# Patient Record
Sex: FEMALE | Race: WHITE | NOT HISPANIC OR LATINO | Employment: OTHER | URBAN - NONMETROPOLITAN AREA
[De-identification: names, ages, dates, MRNs, and addresses within clinical notes are randomized per-mention and may not be internally consistent; named-entity substitution may affect disease eponyms.]

---

## 2019-11-21 ENCOUNTER — HOSPITAL ENCOUNTER (EMERGENCY)
Facility: HOSPITAL | Age: 74
Discharge: HOME | End: 2019-11-21
Attending: EMERGENCY MEDICINE
Payer: COMMERCIAL

## 2019-11-21 ENCOUNTER — APPOINTMENT (EMERGENCY)
Dept: RADIOLOGY | Facility: HOSPITAL | Age: 74
End: 2019-11-21
Payer: COMMERCIAL

## 2019-11-21 VITALS
SYSTOLIC BLOOD PRESSURE: 212 MMHG | OXYGEN SATURATION: 96 % | BODY MASS INDEX: 31.77 KG/M2 | HEART RATE: 66 BPM | RESPIRATION RATE: 18 BRPM | HEIGHT: 64 IN | WEIGHT: 186.07 LBS | DIASTOLIC BLOOD PRESSURE: 104 MMHG | TEMPERATURE: 98.4 F

## 2019-11-21 DIAGNOSIS — S20.211A CONTUSION OF RIGHT CHEST WALL, INITIAL ENCOUNTER: Primary | ICD-10-CM

## 2019-11-21 PROCEDURE — 99285 EMERGENCY DEPT VISIT HI MDM: CPT

## 2019-11-21 PROCEDURE — 99282 EMERGENCY DEPT VISIT SF MDM: CPT | Performed by: PHYSICIAN ASSISTANT

## 2019-11-21 PROCEDURE — 73564 X-RAY EXAM KNEE 4 OR MORE: CPT

## 2019-11-21 PROCEDURE — 71046 X-RAY EXAM CHEST 2 VIEWS: CPT

## 2019-11-21 RX ORDER — HYDROCHLOROTHIAZIDE 25 MG/1
25 TABLET ORAL DAILY
COMMUNITY

## 2019-11-21 RX ORDER — MELATONIN
5000 DAILY
COMMUNITY

## 2019-11-21 RX ORDER — QUINAPRIL 10 MG/1
10 TABLET ORAL DAILY
COMMUNITY

## 2019-11-21 RX ORDER — METOPROLOL SUCCINATE 100 MG/1
100 TABLET, EXTENDED RELEASE ORAL DAILY
COMMUNITY

## 2019-11-21 RX ORDER — VERAPAMIL HYDROCHLORIDE 180 MG/1
180 CAPSULE, EXTENDED RELEASE ORAL DAILY
COMMUNITY
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
## 2019-11-21 NOTE — DISCHARGE INSTRUCTIONS
Follow up with your family doctor within one week or sooner if needed  You should have the incidental finding from your chest x-ray further assessed on an outpatient basis  The results are listed below  Use Tylenol as needed for pain, as well as other conservative measures listed in the patient instructions, RICE  Return to the emergency department for any new, worsening, or concerning symptoms   XR chest 2 views   Final Result      No acute cardiopulmonary disease  Prominence of the superior mediastinum on the right is probably related to ectatic vasculature or perhaps enlarged thyroid  Elective follow-up CT or thyroid ultrasound could be performed if no prior chest x-rays are available for comparison  This was discussed with MIRYAM Reyes            Workstation performed: [id number]         XR knee 4+ vw right injury   Final Result      No acute osseous abnormality  Trace effusion              Workstation performed: [id number]

## 2020-06-15 ENCOUNTER — HOSPITAL ENCOUNTER (EMERGENCY)
Facility: HOSPITAL | Age: 75
Discharge: HOME | End: 2020-06-15
Attending: EMERGENCY MEDICINE | Admitting: EMERGENCY MEDICINE
Payer: MEDICARE

## 2020-06-15 ENCOUNTER — APPOINTMENT (EMERGENCY)
Dept: RADIOLOGY | Facility: HOSPITAL | Age: 75
End: 2020-06-15
Payer: MEDICARE

## 2020-06-15 VITALS
HEIGHT: 66 IN | RESPIRATION RATE: 18 BRPM | BODY MASS INDEX: 28.13 KG/M2 | DIASTOLIC BLOOD PRESSURE: 62 MMHG | OXYGEN SATURATION: 95 % | HEART RATE: 66 BPM | WEIGHT: 175.04 LBS | TEMPERATURE: 98.6 F | SYSTOLIC BLOOD PRESSURE: 127 MMHG

## 2020-06-15 DIAGNOSIS — N39.0 UTI (URINARY TRACT INFECTION): Primary | ICD-10-CM

## 2020-06-15 LAB
ALBUMIN SERPL BCP-MCNC: 3.2 G/DL (ref 3.5–5)
ALP SERPL-CCNC: 82 U/L (ref 46–116)
ALT SERPL W P-5'-P-CCNC: 23 U/L (ref 12–78)
ANION GAP SERPL CALCULATED.3IONS-SCNC: 8 MMOL/L (ref 4–13)
AST SERPL W P-5'-P-CCNC: 23 U/L (ref 5–45)
BACTERIA UR QL AUTO: ABNORMAL /HPF
BASOPHILS # BLD AUTO: 0.02 THOUSANDS/ΜL (ref 0–0.1)
BASOPHILS NFR BLD AUTO: 0 % (ref 0–1)
BILIRUB SERPL-MCNC: 0.39 MG/DL (ref 0.2–1)
BILIRUB UR QL STRIP: ABNORMAL
BUN SERPL-MCNC: 22 MG/DL (ref 5–25)
CALCIUM SERPL-MCNC: 9.2 MG/DL (ref 8.3–10.1)
CHLORIDE SERPL-SCNC: 97 MMOL/L (ref 100–108)
CLARITY UR: CLEAR
CO2 SERPL-SCNC: 30 MMOL/L (ref 21–32)
COLOR UR: YELLOW
CREAT SERPL-MCNC: 1.26 MG/DL (ref 0.6–1.3)
EOSINOPHIL # BLD AUTO: 0 THOUSAND/ΜL (ref 0–0.61)
EOSINOPHIL NFR BLD AUTO: 0 % (ref 0–6)
ERYTHROCYTE [DISTWIDTH] IN BLOOD BY AUTOMATED COUNT: 12.7 % (ref 11.6–15.1)
GFR SERPL CREATININE-BSD FRML MDRD: 41 ML/MIN/1.73SQ M
GLUCOSE SERPL-MCNC: 111 MG/DL (ref 65–140)
GLUCOSE UR STRIP-MCNC: NEGATIVE MG/DL
HCT VFR BLD AUTO: 41.1 % (ref 34.8–46.1)
HGB BLD-MCNC: 14.4 G/DL (ref 11.5–15.4)
HGB UR QL STRIP.AUTO: ABNORMAL
IMM GRANULOCYTES # BLD AUTO: 0.03 THOUSAND/UL (ref 0–0.2)
IMM GRANULOCYTES NFR BLD AUTO: 0 % (ref 0–2)
KETONES UR STRIP-MCNC: NEGATIVE MG/DL
LACTATE SERPL-SCNC: 0.9 MMOL/L (ref 0.5–2)
LEUKOCYTE ESTERASE UR QL STRIP: ABNORMAL
LYMPHOCYTES # BLD AUTO: 1.22 THOUSANDS/ΜL (ref 0.6–4.47)
LYMPHOCYTES NFR BLD AUTO: 16 % (ref 14–44)
MCH RBC QN AUTO: 32.3 PG (ref 26.8–34.3)
MCHC RBC AUTO-ENTMCNC: 35 G/DL (ref 31.4–37.4)
MCV RBC AUTO: 92 FL (ref 82–98)
MONOCYTES # BLD AUTO: 1.05 THOUSAND/ΜL (ref 0.17–1.22)
MONOCYTES NFR BLD AUTO: 13 % (ref 4–12)
NEUTROPHILS # BLD AUTO: 5.51 THOUSANDS/ΜL (ref 1.85–7.62)
NEUTS SEG NFR BLD AUTO: 71 % (ref 43–75)
NITRITE UR QL STRIP: NEGATIVE
NON-SQ EPI CELLS URNS QL MICRO: ABNORMAL /HPF
NRBC BLD AUTO-RTO: 0 /100 WBCS
PH UR STRIP.AUTO: 5.5 [PH]
PLATELET # BLD AUTO: 221 THOUSANDS/UL (ref 149–390)
PMV BLD AUTO: 10.9 FL (ref 8.9–12.7)
POTASSIUM SERPL-SCNC: 3.6 MMOL/L (ref 3.5–5.3)
PROT SERPL-MCNC: 8.1 G/DL (ref 6.4–8.2)
PROT UR STRIP-MCNC: ABNORMAL MG/DL
RBC # BLD AUTO: 4.46 MILLION/UL (ref 3.81–5.12)
RBC #/AREA URNS AUTO: ABNORMAL /HPF
SARS-COV-2 RNA RESP QL NAA+PROBE: NEGATIVE
SODIUM SERPL-SCNC: 135 MMOL/L (ref 136–145)
SP GR UR STRIP.AUTO: >=1.03 (ref 1–1.03)
TROPONIN I SERPL-MCNC: <0.02 NG/ML
UROBILINOGEN UR QL STRIP.AUTO: 1 E.U./DL
WBC # BLD AUTO: 7.83 THOUSAND/UL (ref 4.31–10.16)
WBC #/AREA URNS AUTO: ABNORMAL /HPF

## 2020-06-15 PROCEDURE — 71046 X-RAY EXAM CHEST 2 VIEWS: CPT

## 2020-06-15 PROCEDURE — 81001 URINALYSIS AUTO W/SCOPE: CPT | Performed by: PHYSICIAN ASSISTANT

## 2020-06-15 PROCEDURE — 87040 BLOOD CULTURE FOR BACTERIA: CPT | Performed by: PHYSICIAN ASSISTANT

## 2020-06-15 PROCEDURE — 71045 X-RAY EXAM CHEST 1 VIEW: CPT

## 2020-06-15 PROCEDURE — 36415 COLL VENOUS BLD VENIPUNCTURE: CPT | Performed by: PHYSICIAN ASSISTANT

## 2020-06-15 PROCEDURE — 99284 EMERGENCY DEPT VISIT MOD MDM: CPT

## 2020-06-15 PROCEDURE — 85025 COMPLETE CBC W/AUTO DIFF WBC: CPT | Performed by: PHYSICIAN ASSISTANT

## 2020-06-15 PROCEDURE — 96365 THER/PROPH/DIAG IV INF INIT: CPT

## 2020-06-15 PROCEDURE — 87086 URINE CULTURE/COLONY COUNT: CPT | Performed by: PHYSICIAN ASSISTANT

## 2020-06-15 PROCEDURE — 80053 COMPREHEN METABOLIC PANEL: CPT | Performed by: PHYSICIAN ASSISTANT

## 2020-06-15 PROCEDURE — 87635 SARS-COV-2 COVID-19 AMP PRB: CPT | Performed by: PHYSICIAN ASSISTANT

## 2020-06-15 PROCEDURE — 84484 ASSAY OF TROPONIN QUANT: CPT | Performed by: PHYSICIAN ASSISTANT

## 2020-06-15 PROCEDURE — 99285 EMERGENCY DEPT VISIT HI MDM: CPT | Performed by: EMERGENCY MEDICINE

## 2020-06-15 PROCEDURE — 96361 HYDRATE IV INFUSION ADD-ON: CPT

## 2020-06-15 PROCEDURE — 93005 ELECTROCARDIOGRAM TRACING: CPT

## 2020-06-15 PROCEDURE — 83605 ASSAY OF LACTIC ACID: CPT | Performed by: PHYSICIAN ASSISTANT

## 2020-06-15 RX ORDER — ACETAMINOPHEN 325 MG/1
650 TABLET ORAL ONCE
Status: COMPLETED | OUTPATIENT
Start: 2020-06-15 | End: 2020-06-15

## 2020-06-15 RX ORDER — CEPHALEXIN 500 MG/1
500 CAPSULE ORAL EVERY 12 HOURS SCHEDULED
Qty: 20 CAPSULE | Refills: 0 | Status: SHIPPED | OUTPATIENT
Start: 2020-06-15 | End: 2020-06-25

## 2020-06-15 RX ORDER — CEFTRIAXONE 1 G/50ML
1000 INJECTION, SOLUTION INTRAVENOUS ONCE
Status: COMPLETED | OUTPATIENT
Start: 2020-06-15 | End: 2020-06-15

## 2020-06-15 RX ADMIN — CEFTRIAXONE 1000 MG: 1 INJECTION, SOLUTION INTRAVENOUS at 17:18

## 2020-06-15 RX ADMIN — ACETAMINOPHEN 650 MG: 325 TABLET ORAL at 15:11

## 2020-06-15 RX ADMIN — SODIUM CHLORIDE 1000 ML: 0.9 INJECTION, SOLUTION INTRAVENOUS at 15:05

## 2020-06-16 LAB
ATRIAL RATE: 78 BPM
P AXIS: 61 DEGREES
PR INTERVAL: 216 MS
QRS AXIS: 0 DEGREES
QRSD INTERVAL: 98 MS
QT INTERVAL: 384 MS
QTC INTERVAL: 437 MS
T WAVE AXIS: 48 DEGREES
VENTRICULAR RATE: 78 BPM

## 2020-06-17 ENCOUNTER — HOSPITAL ENCOUNTER (EMERGENCY)
Facility: HOSPITAL | Age: 75
Discharge: HOME | End: 2020-06-17
Attending: EMERGENCY MEDICINE | Admitting: EMERGENCY MEDICINE
Payer: MEDICARE

## 2020-06-17 ENCOUNTER — APPOINTMENT (EMERGENCY)
Dept: CT IMAGING | Facility: HOSPITAL | Age: 75
End: 2020-06-17
Payer: MEDICARE

## 2020-06-17 VITALS
SYSTOLIC BLOOD PRESSURE: 156 MMHG | DIASTOLIC BLOOD PRESSURE: 90 MMHG | WEIGHT: 174.38 LBS | BODY MASS INDEX: 28.03 KG/M2 | RESPIRATION RATE: 16 BRPM | HEART RATE: 70 BPM | TEMPERATURE: 98.9 F | HEIGHT: 66 IN | OXYGEN SATURATION: 96 %

## 2020-06-17 DIAGNOSIS — J18.9 PNEUMONIA: ICD-10-CM

## 2020-06-17 DIAGNOSIS — R50.9 FEVER: Primary | ICD-10-CM

## 2020-06-17 DIAGNOSIS — N39.0 UTI (URINARY TRACT INFECTION): ICD-10-CM

## 2020-06-17 LAB
ALBUMIN SERPL BCP-MCNC: 2.9 G/DL (ref 3.5–5)
ALP SERPL-CCNC: 84 U/L (ref 46–116)
ALT SERPL W P-5'-P-CCNC: 25 U/L (ref 12–78)
ANION GAP SERPL CALCULATED.3IONS-SCNC: 6 MMOL/L (ref 4–13)
APTT PPP: 32 SECONDS (ref 23–37)
AST SERPL W P-5'-P-CCNC: 20 U/L (ref 5–45)
BACTERIA UR CULT: NORMAL
BACTERIA UR QL AUTO: ABNORMAL /HPF
BASOPHILS # BLD AUTO: 0.01 THOUSANDS/ΜL (ref 0–0.1)
BASOPHILS NFR BLD AUTO: 0 % (ref 0–1)
BILIRUB SERPL-MCNC: 0.22 MG/DL (ref 0.2–1)
BILIRUB UR QL STRIP: NEGATIVE
BUN SERPL-MCNC: 21 MG/DL (ref 5–25)
CALCIUM SERPL-MCNC: 9.2 MG/DL (ref 8.3–10.1)
CHLORIDE SERPL-SCNC: 98 MMOL/L (ref 100–108)
CLARITY UR: CLEAR
CO2 SERPL-SCNC: 33 MMOL/L (ref 21–32)
COLOR UR: YELLOW
CREAT SERPL-MCNC: 1.29 MG/DL (ref 0.6–1.3)
EOSINOPHIL # BLD AUTO: 0.04 THOUSAND/ΜL (ref 0–0.61)
EOSINOPHIL NFR BLD AUTO: 1 % (ref 0–6)
ERYTHROCYTE [DISTWIDTH] IN BLOOD BY AUTOMATED COUNT: 12.9 % (ref 11.6–15.1)
GFR SERPL CREATININE-BSD FRML MDRD: 40 ML/MIN/1.73SQ M
GLUCOSE SERPL-MCNC: 104 MG/DL (ref 65–140)
GLUCOSE UR STRIP-MCNC: NEGATIVE MG/DL
HCT VFR BLD AUTO: 38.2 % (ref 34.8–46.1)
HGB BLD-MCNC: 13.1 G/DL (ref 11.5–15.4)
HGB UR QL STRIP.AUTO: ABNORMAL
IMM GRANULOCYTES # BLD AUTO: 0.02 THOUSAND/UL (ref 0–0.2)
IMM GRANULOCYTES NFR BLD AUTO: 0 % (ref 0–2)
INR PPP: 1.25 (ref 0.84–1.19)
KETONES UR STRIP-MCNC: NEGATIVE MG/DL
LACTATE SERPL-SCNC: 0.9 MMOL/L (ref 0.5–2)
LEUKOCYTE ESTERASE UR QL STRIP: NEGATIVE
LYMPHOCYTES # BLD AUTO: 0.96 THOUSANDS/ΜL (ref 0.6–4.47)
LYMPHOCYTES NFR BLD AUTO: 18 % (ref 14–44)
MCH RBC QN AUTO: 31.8 PG (ref 26.8–34.3)
MCHC RBC AUTO-ENTMCNC: 34.3 G/DL (ref 31.4–37.4)
MCV RBC AUTO: 93 FL (ref 82–98)
MONOCYTES # BLD AUTO: 0.93 THOUSAND/ΜL (ref 0.17–1.22)
MONOCYTES NFR BLD AUTO: 17 % (ref 4–12)
NEUTROPHILS # BLD AUTO: 3.53 THOUSANDS/ΜL (ref 1.85–7.62)
NEUTS SEG NFR BLD AUTO: 64 % (ref 43–75)
NITRITE UR QL STRIP: NEGATIVE
NON-SQ EPI CELLS URNS QL MICRO: ABNORMAL /HPF
NRBC BLD AUTO-RTO: 0 /100 WBCS
PH UR STRIP.AUTO: 6 [PH]
PLATELET # BLD AUTO: 233 THOUSANDS/UL (ref 149–390)
PMV BLD AUTO: 11.2 FL (ref 8.9–12.7)
POTASSIUM SERPL-SCNC: 3.9 MMOL/L (ref 3.5–5.3)
PROT SERPL-MCNC: 7.7 G/DL (ref 6.4–8.2)
PROT UR STRIP-MCNC: ABNORMAL MG/DL
PROTHROMBIN TIME: 15.8 SECONDS (ref 11.6–14.5)
RBC # BLD AUTO: 4.12 MILLION/UL (ref 3.81–5.12)
RBC #/AREA URNS AUTO: ABNORMAL /HPF
SODIUM SERPL-SCNC: 137 MMOL/L (ref 136–145)
SP GR UR STRIP.AUTO: 1.02 (ref 1–1.03)
UROBILINOGEN UR QL STRIP.AUTO: 1 E.U./DL
WBC # BLD AUTO: 5.49 THOUSAND/UL (ref 4.31–10.16)
WBC #/AREA URNS AUTO: ABNORMAL /HPF

## 2020-06-17 PROCEDURE — 87040 BLOOD CULTURE FOR BACTERIA: CPT | Performed by: EMERGENCY MEDICINE

## 2020-06-17 PROCEDURE — 99285 EMERGENCY DEPT VISIT HI MDM: CPT | Performed by: EMERGENCY MEDICINE

## 2020-06-17 PROCEDURE — 93005 ELECTROCARDIOGRAM TRACING: CPT

## 2020-06-17 PROCEDURE — 96360 HYDRATION IV INFUSION INIT: CPT

## 2020-06-17 PROCEDURE — 80053 COMPREHEN METABOLIC PANEL: CPT | Performed by: EMERGENCY MEDICINE

## 2020-06-17 PROCEDURE — 85610 PROTHROMBIN TIME: CPT | Performed by: EMERGENCY MEDICINE

## 2020-06-17 PROCEDURE — 85025 COMPLETE CBC W/AUTO DIFF WBC: CPT | Performed by: EMERGENCY MEDICINE

## 2020-06-17 PROCEDURE — 83605 ASSAY OF LACTIC ACID: CPT | Performed by: EMERGENCY MEDICINE

## 2020-06-17 PROCEDURE — 71250 CT THORAX DX C-: CPT

## 2020-06-17 PROCEDURE — 99284 EMERGENCY DEPT VISIT MOD MDM: CPT

## 2020-06-17 PROCEDURE — 81001 URINALYSIS AUTO W/SCOPE: CPT | Performed by: EMERGENCY MEDICINE

## 2020-06-17 PROCEDURE — 36415 COLL VENOUS BLD VENIPUNCTURE: CPT | Performed by: EMERGENCY MEDICINE

## 2020-06-17 PROCEDURE — 85730 THROMBOPLASTIN TIME PARTIAL: CPT | Performed by: EMERGENCY MEDICINE

## 2020-06-17 PROCEDURE — 87086 URINE CULTURE/COLONY COUNT: CPT | Performed by: EMERGENCY MEDICINE

## 2020-06-17 RX ORDER — CEFTRIAXONE 1 G/50ML
1000 INJECTION, SOLUTION INTRAVENOUS ONCE
Status: DISCONTINUED | OUTPATIENT
Start: 2020-06-17 | End: 2020-06-17

## 2020-06-17 RX ORDER — ACETAMINOPHEN 325 MG/1
975 TABLET ORAL ONCE
Status: COMPLETED | OUTPATIENT
Start: 2020-06-17 | End: 2020-06-17

## 2020-06-17 RX ORDER — LEVOFLOXACIN 500 MG/1
500 TABLET, FILM COATED ORAL DAILY
Qty: 10 TABLET | Refills: 0 | Status: SHIPPED | OUTPATIENT
Start: 2020-06-17 | End: 2020-06-27

## 2020-06-17 RX ADMIN — SODIUM CHLORIDE 1000 ML: 0.9 INJECTION, SOLUTION INTRAVENOUS at 16:41

## 2020-06-17 RX ADMIN — LEVOFLOXACIN 750 MG: 500 TABLET, FILM COATED ORAL at 17:46

## 2020-06-17 RX ADMIN — ACETAMINOPHEN 975 MG: 325 TABLET ORAL at 16:38

## 2020-06-18 LAB
ATRIAL RATE: 69 BPM
BACTERIA UR CULT: NORMAL
P AXIS: 74 DEGREES
PR INTERVAL: 216 MS
QRS AXIS: 49 DEGREES
QRSD INTERVAL: 98 MS
QT INTERVAL: 434 MS
QTC INTERVAL: 465 MS
T WAVE AXIS: -70 DEGREES
VENTRICULAR RATE: 69 BPM

## 2020-06-20 LAB — BACTERIA BLD CULT: NORMAL

## 2020-06-24 LAB
BACTERIA BLD CULT: NORMAL
BACTERIA BLD CULT: NORMAL

## 2021-10-21 ENCOUNTER — HOSPITAL ENCOUNTER (EMERGENCY)
Facility: HOSPITAL | Age: 76
Discharge: HOME | End: 2021-10-21
Attending: EMERGENCY MEDICINE | Admitting: EMERGENCY MEDICINE
Payer: MEDICARE

## 2021-10-21 VITALS
HEART RATE: 62 BPM | WEIGHT: 170 LBS | BODY MASS INDEX: 29.02 KG/M2 | OXYGEN SATURATION: 95 % | HEIGHT: 64 IN | TEMPERATURE: 98.3 F | RESPIRATION RATE: 16 BRPM
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
## 2021-10-21 PROCEDURE — 99282 EMERGENCY DEPT VISIT SF MDM: CPT

## 2021-10-21 PROCEDURE — 99284 EMERGENCY DEPT VISIT MOD MDM: CPT | Performed by: EMERGENCY MEDICINE

## 2021-10-21 RX ORDER — DOXYCYCLINE HYCLATE 100 MG/1
100 CAPSULE ORAL ONCE
Status: COMPLETED | OUTPATIENT
Start: 2021-10-21 | End: 2021-10-21

## 2021-10-21 RX ORDER — DOXYCYCLINE HYCLATE 100 MG/1
100 CAPSULE ORAL 2 TIMES DAILY
Qty: 14 CAPSULE | Refills: 0 | Status: SHIPPED | OUTPATIENT
Start: 2021-10-21 | End: 2021-10-28

## 2021-10-21 RX ADMIN — DOXYCYCLINE 100 MG: 100 CAPSULE ORAL at 12:42

## 2023-08-07 RX ORDER — HYDROCHLOROTHIAZIDE 25 MG/1
TABLET ORAL
COMMUNITY
End: 2023-08-08

## 2023-08-07 RX ORDER — VERAPAMIL HYDROCHLORIDE 180 MG/1
CAPSULE, EXTENDED RELEASE ORAL
COMMUNITY
End: 2023-08-08

## 2023-08-07 RX ORDER — LORAZEPAM 0.5 MG/1
0.5 TABLET ORAL
COMMUNITY

## 2023-08-08 ENCOUNTER — OFFICE VISIT (OUTPATIENT)
Dept: FAMILY MEDICINE CLINIC | Facility: CLINIC | Age: 78
End: 2023-08-08
Payer: MEDICARE

## 2023-08-08 VITALS
BODY MASS INDEX: 28.03 KG/M2 | HEIGHT: 66 IN | HEART RATE: 56 BPM | DIASTOLIC BLOOD PRESSURE: 85 MMHG | SYSTOLIC BLOOD PRESSURE: 125 MMHG | WEIGHT: 174.38 LBS | OXYGEN SATURATION: 96 % | TEMPERATURE: 98.5 F

## 2023-08-08 DIAGNOSIS — E55.9 VITAMIN D DEFICIENCY: ICD-10-CM

## 2023-08-08 DIAGNOSIS — E78.2 MIXED HYPERLIPIDEMIA: ICD-10-CM

## 2023-08-08 DIAGNOSIS — N18.31 STAGE 3A CHRONIC KIDNEY DISEASE (HCC): ICD-10-CM

## 2023-08-08 DIAGNOSIS — K76.89 HEPATIC CYST: ICD-10-CM

## 2023-08-08 DIAGNOSIS — K82.4 GALLBLADDER POLYP: ICD-10-CM

## 2023-08-08 DIAGNOSIS — K59.09 CHRONIC CONSTIPATION: ICD-10-CM

## 2023-08-08 DIAGNOSIS — Z76.89 ENCOUNTER TO ESTABLISH CARE: Primary | ICD-10-CM

## 2023-08-08 DIAGNOSIS — I10 ESSENTIAL HYPERTENSION: ICD-10-CM

## 2023-08-08 DIAGNOSIS — M85.80 OSTEOPENIA AFTER MENOPAUSE: ICD-10-CM

## 2023-08-08 DIAGNOSIS — I42.2 HYPERTROPHIC CARDIOMYOPATHY (HCC): ICD-10-CM

## 2023-08-08 DIAGNOSIS — K86.2 PANCREATIC CYST: ICD-10-CM

## 2023-08-08 DIAGNOSIS — Z78.0 OSTEOPENIA AFTER MENOPAUSE: ICD-10-CM

## 2023-08-08 PROBLEM — M81.0 OSTEOPOROSIS WITHOUT CURRENT PATHOLOGICAL FRACTURE: Status: ACTIVE | Noted: 2023-08-08

## 2023-08-08 PROBLEM — R42 VERTIGO: Status: ACTIVE | Noted: 2023-08-08

## 2023-08-08 PROCEDURE — 99205 OFFICE O/P NEW HI 60 MIN: CPT | Performed by: FAMILY MEDICINE

## 2023-08-08 RX ORDER — RAMIPRIL 10 MG/1
1 CAPSULE ORAL DAILY
COMMUNITY
Start: 2023-06-26

## 2023-08-08 NOTE — ASSESSMENT & PLAN NOTE
History of low vitamin D >5 years ago  Recent labs have shown improvement with levels in 40's   Continue vitamin D supplementation daily 1000u  Mgmt of osteopenia as above  Monitor Vit D labs q12 months.

## 2023-08-08 NOTE — ASSESSMENT & PLAN NOTE
3/3/23  8:25 AM   Cholesterol 188    Triglycerides 122    HDL 46    LDL Calculated 117.6    Non HDL Chol. (LDL+VLDL) 142    Chol/HDL Ratio 4.09      Cholesterol levels mildly elevated >5 years ago  Most recent labs within goal range  Overall, has been lifestyle controlled  Recommend continue with healthy lifestyle habits  Monitor lipid panel q 6-12 mo

## 2023-08-08 NOTE — ASSESSMENT & PLAN NOTE
Chronic, follows with cardiology at [facility]  Echocardiogram 6/14/2022: "moderate concentric left ventricular hypertrophy with ejection fraction of 80% with indeterminate diastolic parameters, minimal trace aortic regurgitation, trace to mild mitral regurgitation, trace tricuspid regurgitation with normal pulmonary pressure estimate.  Overall, no significant change."     Plan: continue toprol XL 100mg daily, verapamil 180mg daily  Continue to monitor BMP, Lipid q6-12mo  F/u 6 mo

## 2023-08-08 NOTE — PROGRESS NOTES
Name: Gurpreet Kapadia      :       MRN: [id number]  Encounter Provider: Cody Ugalde DO  Encounter Date: 2023   Encounter department: [facility]    Assessment & Plan     1. Encounter to establish care    2. Hepatic cyst  Assessment & Plan:  Reviewed Surgical Oncology office visit 2023:  "had this liver mass since .  it was 7 cm, now at 11 cm. She also has a smaller hemangioma 5 cm which is not exophytic. These are benign and if they are not causing her symptoms we can leave them alone for now."    Patient remains asymptomatic   Monitor CMP q6-12 mo  Continue to monitor for development of symptoms    Orders:  -     Comprehensive metabolic panel; Future    3. 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 This is a cystic tumor in the pancreas which has a chance to develop into cancer, a 50 - 70% chance, if left alone."     Per their plan: "MRI/MRCP of the abdomen in 6 months to a year. This can be done by her PCP or DDA. She should f/u in the office after imaging."     At this time, patient remains asymptomatic. Will plan for MRI/MRCP in Spring 2024 based on the above recommendations. 4. Gallbladder polyp  Assessment & Plan:  Reviewed surgical oncology note from 2023: "By US of 10/14/2022 she has multiple GB polyps, the largest 6 mm. This was not confirmed by MRI of 2023. If she does have gallbladder polyps, they have a chance to develop into cancer. recommendation is to have an MRI/MRCP of the abdomen in 6 months to a year. This can be done by her PCP or DDA. She should f/u in the office after imaging."    At this time, patient remains asymptomatic. Will plan for MRI/MRCP in Spring 2024. Continue to monitor for symptom development.        5. Mixed hyperlipidemia  Assessment & Plan:   3/3/23  8:25 AM   Cholesterol 188 Triglycerides 122    HDL 46    LDL Calculated 117.6    Non HDL Chol. (LDL+VLDL) 142    Chol/HDL Ratio 4.09      Cholesterol levels mildly elevated >5 years ago  Most recent labs within goal range  Overall, has been lifestyle controlled  Recommend continue with healthy lifestyle habits  Monitor lipid panel q 6-12 mo    Orders:  -     Comprehensive metabolic panel; Future  -     Lipid panel; Future    6. Essential hypertension  Assessment & Plan:  Chronic, stable  Current Blood Pressure: 125/85  Follows with Cardiology at [facility]   Current meds: HCTZ 25mg daily, toprol XL 100mg daily, ramipril 10mg daily, verapamil 180mg daily    Plan:  Continue current medications  Monitor BMP q6mo  F/u 6mo    Orders:  -     Comprehensive metabolic panel; Future  -     Lipid panel; Future    7. Osteopenia after menopause  Assessment & Plan:  Dexa 2020: 1. Normal central bone density. 2. Density of the lumbar spine increased by 0.2% from the prior exam which is not significant. 3. Density of the left hip increased by 6.1% from the prior exam which is significant. Continue vit D and Ca supplementation  Continue healthy lifestyle habits  Monitor dexa q2 years, will plan for repeat dexa ordered at follow up AMW visit in march 2024.       8. Vitamin D deficiency  Assessment & Plan:  History of low vitamin D >5 years ago  Recent labs have shown improvement with levels in 40's   Continue vitamin D supplementation daily 1000u  Mgmt of osteopenia as above  Monitor Vit D labs q12 months. Orders:  -     Vitamin D 25 hydroxy; Future    9. Hypertrophic cardiomyopathy ([address])  Assessment & Plan:  Chronic, follows with cardiology at [facility]  Echocardiogram 6/14/2022: "moderate concentric left ventricular hypertrophy with ejection fraction of 80% with indeterminate diastolic parameters, minimal trace aortic regurgitation, trace to mild mitral regurgitation, trace tricuspid regurgitation with normal pulmonary pressure estimate. Overall, no significant change."     Plan: continue toprol XL 100mg daily, verapamil 180mg daily  Continue to monitor BMP, Lipid q6-12mo  F/u 6 mo    Orders:  -     Comprehensive metabolic panel; Future  -     Lipid panel; Future    10. Stage 3a chronic kidney disease ([address])  Assessment & Plan:  Chronic, stable  Labs 3/2023: Cr 1.14, GFR 46  Continue mgmt HTN as above  Continue to monitor with labs q 6mo     Orders:  -     Comprehensive metabolic panel; Future  -     Lipid panel; Future    11. Chronic constipation  Assessment & Plan:  Chronic, stable  Continue daily fiber supplementation  Continue to follow with GI as scheduled  Continue to monitor for acute worsening of symptoms          Return in about 6 months (around 2/8/2024) for Recheck chronic conditions. A total of 70 minutes was spent rendering care for this patient. This time included review of the patient's electronic medical record, performing the history and physical, reviewing appropriate labs and/or images, developing a treatment and assessment plan, answering patient's questions and concerns, and documenting the patient visit. Subjective      HPI   CC: establish care    PMH: vertigo, vit D deficiency, osteopenia, HLD, HTN, CKD3, hepatic cyst, pancreatic cyst, gallbladder polyps, hypertrophic cardiomyopathy, chronic constipation  SurgHx:    APPENDECTOMY >20 years ago   RIGHT TOTAL KNEE REPLACEMENT Right 07/21/2017    TOTAL ABD HYSTERECTOMY W/ BILATERAL SALPINGOOPHORECTOMY 4/5/94    [facility] D[facility]C 7/31/90   Allergies: IV contrast (hives), and yellow food dye  Medications: vit D 100u daily, HCTZ 25mg daily, ativan 0.5mg hs prn, toprol XL 100mg daily, ramipril 10mg daily, verapamil 180mg daily  FamHx: HTN and DM in the family particularly on her mother's size. SocialHx:   Tobacco: never   Alcohol: on average one per week on average. 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        Only current report from patient is ongoing chronic constipation. She takes fiber supplement daily with improvement in her bowel habits. If she takes the fiber, she goes about once daily on average. If she misses her fiber supplement, she has worsening of her constipation. She also follows with GI doctor at [facility] and they have told her she does not need colonoscopy anymore. She just saw them earlier this year. She also reports having chronic intermittent insomnia that she states is related to anxiety. She takes ativan 0.5mg at bedtime as needed but not every night. Review of Systems   Constitutional: Negative for activity change, appetite change, chills, diaphoresis, fever and unexpected weight change. HENT: Negative for congestion, rhinorrhea, sore throat and trouble swallowing. Eyes: Negative for visual disturbance. Respiratory: Negative for cough, shortness of breath and wheezing. Cardiovascular: Negative for chest pain, palpitations and leg swelling. Gastrointestinal: Positive for constipation (chronic). Negative for abdominal pain, blood in stool and diarrhea. Genitourinary: Negative for difficulty urinating, dysuria, frequency and hematuria. Musculoskeletal: Negative for arthralgias, back pain and joint swelling. Skin: Negative for color change and rash. Neurological: Negative for dizziness, light-headedness and headaches. Psychiatric/Behavioral: Negative for dysphoric mood. The patient is not nervous/anxious.         Current Outpatient Medications on File Prior to Visit   Medication Sig   • cholecalciferol (VITAMIN D3) 1,000 units tablet Take 5,000 Units by mouth daily   • hydrochlorothiazide (HYDRODIURIL) 25 mg tablet Take 25 mg by mouth daily   • LORazepam (ATIVAN) 0.5 mg tablet Take 0.5 mg by mouth daily at bedtime as needed   • metoprolol succinate (TOPROL-XL) 100 mg 24 hr tablet Take 100 mg by mouth daily   • ramipril (ALTACE) 10 MG capsule Take 1 capsule by mouth daily   • verapamil (VERELAN PM) 180 MG 24 hr capsule Take 180 mg by mouth daily   • [DISCONTINUED] Cholecalciferol 50 MCG (2000 UT) TABS Take 1,000 Units by mouth   • [DISCONTINUED] Diclofenac Sodium (VOLTAREN) 1 %  (Patient not taking: Reported on 8/8/2023)   • [DISCONTINUED] hydrochlorothiazide (HYDRODIURIL) 25 mg tablet  (Patient not taking: Reported on 8/8/2023)   • [DISCONTINUED] quinapril (ACCUPRIL) 10 mg tablet Take 10 mg by mouth daily (Patient not taking: Reported on 8/8/2023)   • [DISCONTINUED] verapamil (VERELAN PM) 180 MG 24 hr capsule  (Patient not taking: Reported on 8/8/2023)       Objective     /85 (BP Location: Left arm, Patient Position: Sitting, Cuff Size: Standard)   Pulse 56   Temp 98.5 °F (36.9 °C) (Tympanic)   Ht 5' 6" (1.676 m)   Wt 79.1 kg (174 lb 6.1 oz)   SpO2 96%   BMI 28.15 kg/m²     Physical Exam  Vitals reviewed. Constitutional:       Appearance: She is normal weight. HENT:      Head: Normocephalic and atraumatic. Right Ear: External ear normal.      Left Ear: External ear normal.   Eyes:      Extraocular Movements: Extraocular movements intact. Conjunctiva/sclera: Conjunctivae normal.   Cardiovascular:      Rate and Rhythm: Normal rate and regular rhythm. Pulses: Normal pulses. Heart sounds: Normal heart sounds. Pulmonary:      Effort: Pulmonary effort is normal.      Breath sounds: Normal breath sounds. Abdominal:      General: Abdomen is flat. Bowel sounds are normal.      Palpations: Abdomen is soft. Tenderness: There is no abdominal tenderness. Musculoskeletal:      Cervical back: Neck supple. Right lower leg: No edema. Left lower leg: No edema. Skin:     General: Skin is warm. Neurological:      General: No focal deficit present. Mental Status: She is alert.    Psychiatric:         Mood and Affect: Mood normal.         Behavior: Behavior normal.          Aracelis Bowers DO

## 2023-08-08 NOTE — ASSESSMENT & PLAN NOTE
Dexa 2020: 1. Normal central bone density. 2. Density of the lumbar spine increased by 0.2% from the prior exam which is not significant. 3. Density of the left hip increased by 6.1% from the prior exam which is significant. Continue vit D and Ca supplementation  Continue healthy lifestyle habits  Monitor dexa q2 years, will plan for repeat dexa ordered at follow up AMW visit in march 2024.

## 2023-08-08 NOTE — ASSESSMENT & PLAN NOTE
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 This is a cystic tumor in the pancreas which has a chance to develop into cancer, a 50 - 70% chance, if left alone."     Per their plan: "MRI/MRCP of the abdomen in 6 months to a year. This can be done by her PCP or DDA. She should f/u in the office after imaging."     At this time, patient remains asymptomatic. Will plan for MRI/MRCP in Spring 2024 based on the above recommendations.

## 2023-08-08 NOTE — ASSESSMENT & PLAN NOTE
Reviewed surgical oncology note from 6/2023: "By US of 10/14/2022 she has multiple GB polyps, the largest 6 mm. This was not confirmed by MRI of 02/09/2023. If she does have gallbladder polyps, they have a chance to develop into cancer. recommendation is to have an MRI/MRCP of the abdomen in 6 months to a year. This can be done by her PCP or DDA. She should f/u in the office after imaging."    At this time, patient remains asymptomatic. Will plan for MRI/MRCP in Spring 2024. Continue to monitor for symptom development.

## 2023-08-08 NOTE — ASSESSMENT & PLAN NOTE
Reviewed Surgical Oncology office visit 6/2023:  "had this liver mass since 1994. 2014 it was 7 cm, now at 11 cm. She also has a smaller hemangioma 5 cm which is not exophytic.  These are benign and if they are not causing her symptoms we can leave them alone for now."    Patient remains asymptomatic   Monitor CMP q6-12 mo  Continue to monitor for development of symptoms

## 2023-08-08 NOTE — ASSESSMENT & PLAN NOTE
Chronic, stable  Current Blood Pressure: 125/85  Follows with Cardiology at [facility]   Current meds:  HCTZ 25mg daily, toprol XL 100mg daily, ramipril 10mg daily, verapamil 180mg daily    Plan:  Continue current medications  Monitor BMP q6mo  F/u 6mo

## 2023-08-08 NOTE — ASSESSMENT & PLAN NOTE
Chronic, stable  Labs 3/2023: Cr 1.14, GFR 46  Continue mgmt HTN as above  Continue to monitor with labs q 6mo

## 2023-08-08 NOTE — ASSESSMENT & PLAN NOTE
Chronic, stable  Continue daily fiber supplementation  Continue to follow with GI as scheduled  Continue to monitor for acute worsening of symptoms

## 2023-08-11 ENCOUNTER — TELEPHONE (OUTPATIENT)
Dept: ADMINISTRATIVE | Facility: OTHER | Age: 78
End: 2023-08-11

## 2023-08-11 NOTE — TELEPHONE ENCOUNTER
----- Message from Valentine Howell sent at 8/8/2023  2:03 PM EDT -----  Regarding: Annual Medicare Visit  08/08/23 2:04 PM    Shahzad, our patient Osiris Doyle has had Medicare AWV completed/performed. Please assist in updating the patient chart by pulling the Care Everywhere (CE) document. The date of service is 3/15/2023 at [facility].      Thank you,  Valentine [facility]

## 2023-08-17 NOTE — TELEPHONE ENCOUNTER
Upon review of the In Basket request we were able to locate, review, and update the patient chart as requested for Medicare AW. Any additional questions or concerns should be emailed to the Practice Liaisons via the appropriate education email address, please do not reply via In Basket.     Thank you  Governor Leyden

## 2023-09-12 ENCOUNTER — OFFICE VISIT (OUTPATIENT)
Dept: URGENT CARE | Facility: CLINIC | Age: 78
End: 2023-09-12
Payer: MEDICARE

## 2023-09-12 ENCOUNTER — HOSPITAL ENCOUNTER (OUTPATIENT)
Dept: RADIOLOGY | Facility: CLINIC | Age: 78
Discharge: HOME | End: 2023-09-12
Admitting: FAMILY MEDICINE
Payer: MEDICARE

## 2023-09-12 ENCOUNTER — LAB (OUTPATIENT)
Dept: LAB | Facility: CLINIC | Age: 78
End: 2023-09-12
Payer: MEDICARE

## 2023-09-12 VITALS
RESPIRATION RATE: 18 BRPM | TEMPERATURE: 98.1 F | HEART RATE: 64 BPM | DIASTOLIC BLOOD PRESSURE: 90 MMHG | OXYGEN SATURATION: 98 % | SYSTOLIC BLOOD PRESSURE: 158 MMHG

## 2023-09-12 DIAGNOSIS — E78.2 MIXED HYPERLIPIDEMIA: ICD-10-CM

## 2023-09-12 DIAGNOSIS — E55.9 VITAMIN D DEFICIENCY: ICD-10-CM

## 2023-09-12 DIAGNOSIS — K76.89 HEPATIC CYST: ICD-10-CM

## 2023-09-12 DIAGNOSIS — M25.552 PAIN OF LEFT HIP: Primary | ICD-10-CM

## 2023-09-12 DIAGNOSIS — I42.2 HYPERTROPHIC CARDIOMYOPATHY (HCC): ICD-10-CM

## 2023-09-12 DIAGNOSIS — N18.31 STAGE 3A CHRONIC KIDNEY DISEASE (HCC): ICD-10-CM

## 2023-09-12 DIAGNOSIS — I10 ESSENTIAL HYPERTENSION: ICD-10-CM

## 2023-09-12 DIAGNOSIS — M25.552 PAIN OF LEFT HIP: ICD-10-CM

## 2023-09-12 LAB
25(OH)D3 SERPL-MCNC: 45.4 NG/ML (ref 30–100)
ALBUMIN SERPL BCP-MCNC: 4.2 G/DL (ref 3.5–5)
ALP SERPL-CCNC: 76 U/L (ref 34–104)
ALT SERPL W P-5'-P-CCNC: 16 U/L (ref 7–52)
ANION GAP SERPL CALCULATED.3IONS-SCNC: 5 MMOL/L
AST SERPL W P-5'-P-CCNC: 15 U/L (ref 13–39)
BILIRUB SERPL-MCNC: 0.52 MG/DL (ref 0.2–1)
BUN SERPL-MCNC: 25 MG/DL (ref 5–25)
CALCIUM SERPL-MCNC: 9.4 MG/DL (ref 8.4–10.2)
CHLORIDE SERPL-SCNC: 105 MMOL/L (ref 96–108)
CHOLEST SERPL-MCNC: 191 MG/DL
CO2 SERPL-SCNC: 31 MMOL/L (ref 21–32)
CREAT SERPL-MCNC: 1.04 MG/DL (ref 0.6–1.3)
GFR SERPL CREATININE-BSD FRML MDRD: 50 ML/MIN/1.73SQ M
GLUCOSE P FAST SERPL-MCNC: 86 MG/DL (ref 65–99)
HDLC SERPL-MCNC: 45 MG/DL
LDLC SERPL CALC-MCNC: 126 MG/DL (ref 0–100)
NONHDLC SERPL-MCNC: 146 MG/DL
POTASSIUM SERPL-SCNC: 4.2 MMOL/L (ref 3.5–5.3)
PROT SERPL-MCNC: 7.1 G/DL (ref 6.4–8.4)
SODIUM SERPL-SCNC: 141 MMOL/L (ref 135–147)
TRIGL SERPL-MCNC: 98 MG/DL

## 2023-09-12 PROCEDURE — 99213 OFFICE O/P EST LOW 20 MIN: CPT

## 2023-09-12 PROCEDURE — 82306 VITAMIN D 25 HYDROXY: CPT

## 2023-09-12 PROCEDURE — G0463 HOSPITAL OUTPT CLINIC VISIT: HCPCS

## 2023-09-12 PROCEDURE — 73502 X-RAY EXAM HIP UNI 2-3 VIEWS: CPT

## 2023-09-12 PROCEDURE — 80053 COMPREHEN METABOLIC PANEL: CPT

## 2023-09-12 PROCEDURE — 36415 COLL VENOUS BLD VENIPUNCTURE: CPT

## 2023-09-12 PROCEDURE — 80061 LIPID PANEL: CPT

## 2023-09-12 NOTE — PROGRESS NOTES
[facility]        NAME: Roddy Butts is a 78 y.o. female  : 1945    MRN: [id number]  DATE: 2023  TIME: 9:49 AM    Assessment and Plan   Pain of left hip [M25.552]  1. Pain of left hip  XR hip/pelv 2-3 vws left if performed    Ambulatory Referral to Orthopedic Surgery        Discussed problem with patient. Placing referral to ortho due to non improving symptoms and duration of symptoms. Xray revealed no acute abnormalities but awaiting official radiology interpretation. Advised tylenol for pain symptoms due to [facility] [place]. Should also be icing for inflammatory component. Patient Instructions       Follow up with PCP in 3-5 days. Proceed to  ER if symptoms worsen. Chief Complaint     Chief Complaint   Patient presents with   • Hip Pain     C/o left hip pain x3 weeks; denies any injury/trauma         History of Present Illness       79 y/o female presents with left hip pain for the last three weeks. PMH of osteopenia taking vitamin d and calcium. Denies any specific injury or trauma but now reports difficulty bearing weight. 0/10 at rest. Hurts with weight bearing 4/10. Taking ibuprofen for pain, CKD3a. Walking without limp but states it hurts worse when getting up from a chair. Review of Systems   Review of Systems   Constitutional: Negative for appetite change, chills, fatigue and fever. Respiratory: Negative for cough, shortness of breath, wheezing and stridor. Cardiovascular: Negative for chest pain and palpitations. Musculoskeletal: Negative for gait problem. Left hip pain   Neurological: Negative for dizziness, syncope, light-headedness and headaches.          Current Medications       Current Outpatient Medications:   •  cholecalciferol (VITAMIN D3) 1,000 units tablet, Take 5,000 Units by mouth daily, Disp: , Rfl:   •  hydrochlorothiazide (HYDRODIURIL) 25 mg tablet, Take 25 mg by mouth daily, Disp: , Rfl:   •  LORazepam (ATIVAN) 0.5 mg tablet, Take 0.5 mg by mouth daily at bedtime as needed, Disp: , Rfl:   •  metoprolol succinate (TOPROL-XL) 100 mg 24 hr tablet, Take 100 mg by mouth daily, Disp: , Rfl:   •  ramipril (ALTACE) 10 MG capsule, Take 1 capsule by mouth daily, Disp: , Rfl:   •  verapamil (VERELAN PM) 180 MG 24 hr capsule, Take 180 mg by mouth daily, Disp: , Rfl:     Current Allergies     Allergies as of 09/12/2023 - Reviewed 09/12/2023   Allergen Reaction Noted   • Iodinated contrast media Other (See Comments) and Hives 07/06/2017   • Yellow dye - food allergy Other (See Comments) 11/21/2019            The following portions of the patient's history were reviewed and updated as appropriate: allergies, current medications, past family history, past medical history, past social history, past surgical history and problem list.     Past Medical History:   Diagnosis Date   • Hypertension    • Vertigo        Past Surgical History:   Procedure Laterality Date   • APPENDECTOMY     • HYSTERECTOMY     • JOINT REPLACEMENT         History reviewed. No pertinent family history. Medications have been verified. Objective   /90   Pulse 64   Temp 98.1 °F (36.7 °C)   Resp 18   SpO2 98%        Physical Exam     Physical Exam  Vitals and nursing note reviewed. Constitutional:       General: She is not in acute distress. Appearance: Normal appearance. She is normal weight. She is not ill-appearing, toxic-appearing or diaphoretic. HENT:      Head: Normocephalic. Cardiovascular:      Rate and Rhythm: Normal rate and regular rhythm. Pulses: Normal pulses. Heart sounds: Normal heart sounds. No murmur heard. No friction rub. No gallop. Pulmonary:      Effort: Pulmonary effort is normal. No respiratory distress. Breath sounds: Normal breath sounds. No stridor. No wheezing, rhonchi or rales. Chest:      Chest wall: No tenderness. Musculoskeletal:      Left hip: Bony tenderness present.  No deformity, lacerations, tenderness or crepitus. Normal range of motion. Decreased strength. Neurological:      Mental Status: She is alert.

## 2023-09-13 ENCOUNTER — OFFICE VISIT (OUTPATIENT)
Dept: OBGYN CLINIC | Facility: CLINIC | Age: 78
End: 2023-09-13
Payer: MEDICARE

## 2023-09-13 VITALS
DIASTOLIC BLOOD PRESSURE: 90 MMHG | WEIGHT: 176 LBS | HEART RATE: 65 BPM | HEIGHT: 64 IN | TEMPERATURE: 97.8 F | SYSTOLIC BLOOD PRESSURE: 168 MMHG | BODY MASS INDEX: 30.05 KG/M2

## 2023-09-13 DIAGNOSIS — M70.62 TROCHANTERIC BURSITIS OF LEFT HIP: Primary | ICD-10-CM

## 2023-09-13 DIAGNOSIS — M25.552 PAIN OF LEFT HIP: ICD-10-CM

## 2023-09-13 PROCEDURE — 20610 DRAIN/INJ JOINT/BURSA W/O US: CPT | Performed by: ORTHOPAEDIC SURGERY

## 2023-09-13 PROCEDURE — 99203 OFFICE O/P NEW LOW 30 MIN: CPT | Performed by: ORTHOPAEDIC SURGERY

## 2023-09-13 RX ORDER — TRIAMCINOLONE ACETONIDE 40 MG/ML
40 INJECTION, SUSPENSION INTRA-ARTICULAR; INTRAMUSCULAR
Status: COMPLETED | OUTPATIENT
Start: 2023-09-13 | End: 2023-09-13

## 2023-09-13 RX ORDER — BUPIVACAINE HYDROCHLORIDE 2.5 MG/ML
4 INJECTION, SOLUTION INFILTRATION; PERINEURAL
Status: COMPLETED | OUTPATIENT
Start: 2023-09-13 | End: 2023-09-13

## 2023-09-13 RX ORDER — IBUPROFEN 200 MG
TABLET ORAL EVERY 6 HOURS PRN
COMMUNITY

## 2023-09-13 RX ADMIN — BUPIVACAINE HYDROCHLORIDE 4 ML: 2.5 INJECTION, SOLUTION INFILTRATION; PERINEURAL at 11:15

## 2023-09-13 RX ADMIN — TRIAMCINOLONE ACETONIDE 40 MG: 40 INJECTION, SUSPENSION INTRA-ARTICULAR; INTRAMUSCULAR at 11:15

## 2023-09-13 NOTE — PROGRESS NOTES
ASSESSMENT/PLAN:    Diagnoses and all orders for this visit:    Trochanteric bursitis of left hip    Pain of left hip  -     Ambulatory Referral to Orthopedic Surgery    Other orders  -     ibuprofen (MOTRIN) 200 mg tablet; Take by mouth every 6 (six) hours as needed for mild pain        Plan: Treatment was discussed. Injection was performed utilizing a combination of local anesthetic and Kenalog. She tolerated this well. She did not want to attend physical therapy despite my recommendations that this would be the most likely treatment to provide her long-term benefit. At this time, she does not believe she has the time nor the interest in attending physical therapy. I will plan to see her back as needed. I have encouraged her to contact me if she were to change her mind regarding physical therapy and a prescription can be provided or if the injection does not provide adequate relief and she wishes to discuss further treatment options. Return if symptoms worsen or fail to improve.      _____________________________________________________  CHIEF COMPLAINT:  Chief Complaint   Patient presents with   • Left Hip - Pain         SUBJECTIVE:  Buddy Villalta is a 78y.o. year old female who presents for evaluation of her left hip. She noted onset of pain about 3 weeks ago but admits that she had a lesser degree of some hip symptoms prior to 3 weeks ago. She localizes pain directly over the left greater trochanter and denies any radiation of pain. She denies any groin pain. She states that she was getting blood work done yesterday, was at the [facility] and was eventually seen by the physician, x-rays of her hip ordered and she was referred for orthopedic consultation and treatment. She denies right-sided symptoms. She was offered no treatment by the physician at the [facility]. She has taken some Advil which seems to provide some relief.     PAST MEDICAL HISTORY:  Past Medical History:   Diagnosis Date • Hypertension    • Vertigo        PAST SURGICAL HISTORY:  Past Surgical History:   Procedure Laterality Date   • APPENDECTOMY     • HYSTERECTOMY     • JOINT REPLACEMENT         FAMILY HISTORY:  History reviewed. No pertinent family history. SOCIAL HISTORY:  Social History     Tobacco Use   • Smoking status: Never   • Smokeless tobacco: Never   Vaping Use   • Vaping Use: Never used   Substance Use Topics   • Alcohol use: Yes     Comment: socially   • Drug use: Not Currently       MEDICATIONS:    Current Outpatient Medications:   •  cholecalciferol (VITAMIN D3) 1,000 units tablet, Take 5,000 Units by mouth daily, Disp: , Rfl:   •  hydrochlorothiazide (HYDRODIURIL) 25 mg tablet, Take 25 mg by mouth daily, Disp: , Rfl:   •  ibuprofen (MOTRIN) 200 mg tablet, Take by mouth every 6 (six) hours as needed for mild pain, Disp: , Rfl:   •  LORazepam (ATIVAN) 0.5 mg tablet, Take 0.5 mg by mouth daily at bedtime as needed, Disp: , Rfl:   •  metoprolol succinate (TOPROL-XL) 100 mg 24 hr tablet, Take 100 mg by mouth daily, Disp: , Rfl:   •  ramipril (ALTACE) 10 MG capsule, Take 1 capsule by mouth daily, Disp: , Rfl:   •  verapamil (VERELAN PM) 180 MG 24 hr capsule, Take 180 mg by mouth daily, Disp: , Rfl:     ALLERGIES:  Allergies   Allergen Reactions   • Iodinated Contrast Media Other (See Comments) and Hives   • Yellow Dye - Food Allergy Other (See Comments)       Review of systems:   Constitutional: Negative for fatigue, fever or loss of apetite. HENT: Negative. Respiratory: Negative for shortness of breath, dyspnea. Cardiovascular: Negative for chest pain/tightness. Gastrointestinal: Negative for abdominal pain, N/V. Endocrine: Negative for cold/heat intolerance, unexplained weight loss/gain. Genitourinary: Negative for flank pain, dysuria, hematuria. Musculoskeletal: Positive as in the HPI  Skin: Negative for rash.     Neurological: Negative  Psychiatric/Behavioral: Negative for agitation. _____________________________________________________  PHYSICAL EXAMINATION:    Blood pressure 168/90, pulse 65, temperature 97.8 °F (36.6 °C), temperature source Temporal, height 5' 4" (1.626 m), weight 79.8 kg (176 lb). General: well developed and well nourished, alert, oriented times 3 and appears comfortable  Psychiatric: Normal  HEENT: Benign  Cardiovascular: Regular    Pulmonary: No wheezing or stridor  Abdomen: Soft, Nontender  Skin: No masses, erythema, lacerations, fluctation, ulcerations  Neurovascular: Motor and sensory exams are intact and pulses palpable. MUSCULOSKELETAL EXAMINATION:    The left hip exam demonstrates flexion to 120 degrees without complaints, 20 degrees of internal rotation and 40 degrees of external rotation at the 90 degree flexed position without complaints. She has 40 degrees of abduction in the extended position without complaints. She has marked tenderness to palpation of the greater trochanteric bursa of the left hip. The remainder the left lower extremity exam is benign. The right hip exam demonstrates excellent range of motion, symmetrical with the contralateral left. She has no tenderness over the trochanteric bursa. She has a well-healed incision of the left knee consistent with prior left knee replacement and stable knee exam with mechanical feel.      _____________________________________________________  STUDIES REVIEWED:  X-rays of the patient's pelvis and left hip obtained 12/2023 demonstrate no evidence of acute pathology. There are some minor degenerative changes noted in both hips, right probably is more so than left. The x-ray report was reviewed. The [facility] note was reviewed. PROCEDURES:  Large joint arthrocentesis: L greater trochanteric bursa  Universal Protocol:  Consent: Verbal consent obtained.   Consent given by: patient  Patient understanding: patient states understanding of the procedure being performed    Supporting Documentation  Indications: pain   Procedure Details  Location: hip - L greater trochanteric bursa  Needle size: 22 G  Ultrasound guidance: no  Approach: lateral  Medications administered: 4 mL bupivacaine 0.25 %; 40 mg triamcinolone acetonide 40 mg/mL              Milagros Jaime

## 2023-09-22 ENCOUNTER — TELEPHONE (OUTPATIENT)
Dept: URGENT CARE | Facility: CLINIC | Age: 78
End: 2023-09-22

## 2023-09-22 NOTE — TELEPHONE ENCOUNTER
Pt called and stated she received a letter from Radiology. Pt informed that there is possible a gallstone .  Pt stated she already took care of it

## 2023-10-06 ENCOUNTER — TRANSCRIBE ORDERS (OUTPATIENT)
Dept: FAMILY MEDICINE CLINIC | Facility: CLINIC | Age: 78
End: 2023-10-06

## 2023-10-06 DIAGNOSIS — Z71.89 HEARING AID CONSULTATION: Primary | ICD-10-CM

## 2023-10-23 ENCOUNTER — OFFICE VISIT (OUTPATIENT)
Dept: OBGYN CLINIC | Facility: CLINIC | Age: 78
End: 2023-10-23
Payer: MEDICARE

## 2023-10-23 VITALS
HEART RATE: 62 BPM | SYSTOLIC BLOOD PRESSURE: 136 MMHG | DIASTOLIC BLOOD PRESSURE: 72 MMHG | TEMPERATURE: 97.6 F | WEIGHT: 173.4 LBS | BODY MASS INDEX: 29.6 KG/M2 | HEIGHT: 64 IN

## 2023-10-23 DIAGNOSIS — M70.62 TROCHANTERIC BURSITIS OF LEFT HIP: Primary | ICD-10-CM

## 2023-10-23 PROCEDURE — 99213 OFFICE O/P EST LOW 20 MIN: CPT | Performed by: ORTHOPAEDIC SURGERY

## 2023-10-23 RX ORDER — CELECOXIB 200 MG/1
200 CAPSULE ORAL DAILY
Qty: 30 CAPSULE | Refills: 1 | Status: SHIPPED | OUTPATIENT
Start: 2023-10-23

## 2023-10-23 NOTE — PROGRESS NOTES
ASSESSMENT/PLAN:    Diagnoses and all orders for this visit:    Trochanteric bursitis of left hip    Other orders  -     Ascorbic Acid (DUARTE-C PO); Take by mouth        Plan: I would recommend she begin physical therapy and a prescription was provided. She also question me as to pain medication and I would not recommend any narcotic analgesics but I have prescribed Celebrex. I will see her in 4 to 6 weeks for reevaluation. She is to discontinue the ibuprofen but may take Tylenol in addition to the Celebrex. She was to contact me if any questions or concerns arise. Return for 4 to 6 weeks. _____________________________________________________  CHIEF COMPLAINT:  Chief Complaint   Patient presents with    Follow-up         SUBJECTIVE:  Buddy Villalta is a 78y.o. year old female who presents for follow up of her left greater trochanteric bursitis. She noted excellent response to injection for about 2 weeks but began to experience recurring symptoms since then. She indicates that the pain is now as bad as it was prior to injection, perhaps worse. She localizes pain to the posterior lateral aspect of her left thigh. She denies radiation. She denies groin pain. PAST MEDICAL HISTORY:  Past Medical History:   Diagnosis Date    Hypertension     Vertigo        PAST SURGICAL HISTORY:  Past Surgical History:   Procedure Laterality Date    APPENDECTOMY      HYSTERECTOMY      JOINT REPLACEMENT         FAMILY HISTORY:  History reviewed. No pertinent family history.     SOCIAL HISTORY:  Social History     Tobacco Use    Smoking status: Never    Smokeless tobacco: Never   Vaping Use    Vaping Use: Never used   Substance Use Topics    Alcohol use: Yes     Comment: socially    Drug use: Not Currently       MEDICATIONS:    Current Outpatient Medications:     Ascorbic Acid (DUARTE-C PO), Take by mouth, Disp: , Rfl:     cholecalciferol (VITAMIN D3) 1,000 units tablet, Take 5,000 Units by mouth daily, Disp: , Rfl: hydrochlorothiazide (HYDRODIURIL) 25 mg tablet, Take 25 mg by mouth daily, Disp: , Rfl:     ibuprofen (MOTRIN) 200 mg tablet, Take by mouth every 6 (six) hours as needed for mild pain, Disp: , Rfl:     LORazepam (ATIVAN) 0.5 mg tablet, Take 0.5 mg by mouth daily at bedtime as needed, Disp: , Rfl:     metoprolol succinate (TOPROL-XL) 100 mg 24 hr tablet, Take 100 mg by mouth daily, Disp: , Rfl:     ramipril (ALTACE) 10 MG capsule, Take 1 capsule by mouth daily, Disp: , Rfl:     verapamil (VERELAN PM) 180 MG 24 hr capsule, Take 180 mg by mouth daily, Disp: , Rfl:     ALLERGIES:  Allergies   Allergen Reactions    Iodinated Contrast Media Other (See Comments) and Hives    Yellow Dye - Food Allergy Other (See Comments)       REVIEW OF SYSTEMS:  Pertinent items are noted in HPI. A comprehensive review of systems was negative.      _____________________________________________________  PHYSICAL EXAMINATION:  General: well developed and well nourished, alert, and appears comfortable  Psychiatric: Normal  HEENT:  Normocephalic, atraumatic  Cardiovascular:  Regular  Pulmonary: No wheezing or stridor  Skin: No masses, erthema, lacerations, fluctation, ulcerations  Neurovascular: Motor and sensory exams are intact and pulses palpable. MUSCULOSKELETAL EXAMINATION:    The left hip exam demonstrates good range of motion without complaints. She denies any tenderness with palpation over the greater trochanteric bursa but does complain of tenderness posterior to the greater trochanter. The iliotibial band is nontender. She has excellent strength of resisted abduction and adduction without complaint. Placement into the figure-of-four position elicited complaints of posterolateral thigh pain, near the insertion of the external rotators.           Luis Fernando Arroyo

## 2023-10-23 NOTE — PROGRESS NOTES
PT Evaluation     Today's date: 10/24/2023  Patient name: Wyatt Nogueira  :   MRN: [id number]  Referring provider: Mian Huerta DO  Dx:   Encounter Diagnosis     ICD-10-CM    1. Trochanteric bursitis of left hip  M70.62 Ambulatory Referral to Physical Therapy                     Assessment  Assessment details: PT notes the patient with decrease ROM and strength t/o the left hip and LE with demonstration of impaired gait pattern and balance leading to increase pain levels and functional limitations with need for course of skilled therapy for 6 weeks with focus on strengthening, manual therapy, gait/balance, analgesic modalities, and HEP. Impairments: abnormal gait, abnormal or restricted ROM, activity intolerance, impaired balance, impaired physical strength, lacks appropriate home exercise program and pain with function  Understanding of Dx/Px/POC: fair   Prognosis: good    Goals  ST. Initiate HEP  2. Decrease pain levels by 25-50%   3. Increase ROM by 25-50%  4. Increase strength by 1-2 mm grades  LT. Demonstration of normal gait pattern and balance  2. Decrease limitations with standing, walking and stairs  3. Decrease limitations with lifting and carrying  4. Decrease limitations with transfers and ADL  5.  DC with HEP     Plan  Plan details: PT notes review of POC and findings with the patient who is in agreement with PT recommendations of course of skilled therapy.    Patient would benefit from: PT eval and skilled physical therapy  Planned modality interventions: thermotherapy: hydrocollator packs  Planned therapy interventions: manual therapy, neuromuscular re-education, patient education, self care, strengthening, stretching, therapeutic exercise, balance, flexibility, gait training, graded exercise and home exercise program  Frequency: 2x week  Duration in weeks: 6  Treatment plan discussed with: patient        Subjective Evaluation    History of Present Illness  Mechanism of injury: Patient reports development of left hip pain about 2 months ago with no TAMIKA. Patient reports the last month has been worse with increase pain levels leading to increase pain levels with limitations with walking activities and inability to play tennis/recreational activities. Patient went to ortho MD for evaluation and received a cortisone injection in the left GT bursa with relief for several days but a return of symptoms. Patient now referred to OPPT to address left hip pain. Patient reports she is retired with significant PMH of HTN, right TKA and CKD. Patient Goals  Patient goals for therapy: decreased pain, improved balance, increased motion, increased strength, independence with ADLs/IADLs and return to sport/leisure activities    Pain  Current pain ratin  At best pain ratin  At worst pain ratin  Location: Left hip  Quality: tight, pulling, discomfort and dull ache  Relieving factors: rest and medications  Aggravating factors: walking and stair climbing      Diagnostic Tests  X-ray: normal  Treatments  Previous treatment: injection treatment and medication  Current treatment: medication and physical therapy        Objective     Palpation   Left   Muscle spasm in the TFL. Tenderness of the rectus femoris, sartorius and TFL. Tenderness     Left Hip   Tenderness in the greater trochanter and iliac crest. No tenderness in the PSIS and sacroiliac joint.      Neurological Testing     Reflexes   Left   Patellar (L4): normal (2+)  Achilles (S1): normal (2+)    Right   Patellar (L4): normal (2+)  Achilles (S1): normal (2+)    Active Range of Motion   Left Hip   Flexion: 37 degrees with pain  Extension: WFL  Abduction: 17 degrees with pain  External rotation (90/90): 19 degrees with pain  Internal rotation (90/90): 9 degrees   Left Knee   Normal active range of motion    Additional Active Range of Motion Details  PT notes decrease ROM and strength t/o the left hip and LE     Passive Range of Motion   Left Hip   Flexion: 62 degrees   Abduction: [facility] P[facility]  External rotation (90/90): 22 degrees   Internal rotation (90/90): [facility]    Strength/Myotome Testing     Left Hip   Planes of Motion   Flexion: 4-  Extension: 4  Abduction: 3+  Adduction: 4  External rotation: 4-  Internal rotation: 3+    Left Knee   Flexion: 4+  Extension: 4  Quadriceps contraction: fair    Tests     Left Hip   Positive Saravanan and piriformis. Negative MARELY and FADIR. Pino: Negative. Modified Pino: Negative. 90/90 SLR: Positive. SLR: Positive. Ambulation     Observational Gait   Gait: antalgic   Decreased walking speed, stride length and left stance time.      Additional Observational Gait Details  PT notes decrease stance and push off on the left              Precautions:  PMH Right TKA, CKD, HTN   POC 11/24/23      Manuals 10/24       Left HS, hip ABD, quad, pirifformis, and gastroc with manual hip traction 5 min        Left GTB rolling  5 min                        Neuro Re-Ed        Bridge with ABD         BOSU March         BOSU 3 way SLR         BOSU Squat- Round Down        Side step and squat         Front and lateral lunge                 Ther Ex        [address] Lateral SV for ROM         S/L Clam shells         S/L Hip ABD         Leg press DL and SL                                 HEP update/review  15 min        Ther Activity                        Gait Training                        Modalities        MHP PRN

## 2023-10-24 ENCOUNTER — EVALUATION (OUTPATIENT)
Dept: PHYSICAL THERAPY | Facility: CLINIC | Age: 78
End: 2023-10-24
Payer: MEDICARE

## 2023-10-24 DIAGNOSIS — M70.62 TROCHANTERIC BURSITIS OF LEFT HIP: ICD-10-CM

## 2023-10-24 PROCEDURE — 97161 PT EVAL LOW COMPLEX 20 MIN: CPT | Performed by: PHYSICAL THERAPIST

## 2023-10-24 PROCEDURE — 97535 SELF CARE MNGMENT TRAINING: CPT | Performed by: PHYSICAL THERAPIST

## 2023-10-24 PROCEDURE — 97140 MANUAL THERAPY 1/> REGIONS: CPT | Performed by: PHYSICAL THERAPIST

## 2023-10-26 NOTE — PROGRESS NOTES
Daily Note     Today's date: 10/27/2023  Patient name: Balwinder Hdez  :   MRN: [id number]  Referring provider: Mp Almaraz DO  Dx:   Encounter Diagnosis     ICD-10-CM    1. Trochanteric bursitis of left hip  M70.62                      Subjective:  Patient reports she was active at home with yard work. Patient feels she over did the activity leading to increase pain levels in the left hip to 5/10 level. Patient reports she is compliant with HEP. Objective: See treatment diary below      Assessment: Tolerated treatment well. PT notes start of POC with focus on strengthening and manual therapy to decrease symptoms and improve functional limitations to meet therapy goals. PT notes continuation of decrease ROM and strength t/o the left hip and LE with demonstration of impaired gait pattern and balance with need for continuation of skilled therapy. Plan: Continue per plan of care.       Precautions:  PMH Right TKA, CKD, HTN   POC 23      Manuals 10/24 10/27      Left HS, hip ABD, quad, pirifformis, and gastroc with manual hip traction 5 min  10 min       Left GTB rolling  5 min  5 min                       Neuro Re-Ed        Bridge with ABD   2x10 Blue       BOSU March         BOSU 3 way SLR         BOSU Squat- Round Down        Side step and squat   2x30 Feet  6# Senscient and lateral lunge                 Ther Ex        [facility] for ROM   10 min L1       S/L Clam shells   2x10 Left Only Blue       S/L Hip ABD   2x10 Left Only       Leg press DL and SL   2x10 Each   85# DL  45# SL                               HEP update/review  15 min  10 min       Ther Activity                        Gait Training                        Modalities        MHP PRN 15 min Left Hip

## 2023-10-27 ENCOUNTER — OFFICE VISIT (OUTPATIENT)
Dept: PHYSICAL THERAPY | Facility: CLINIC | Age: 78
End: 2023-10-27
Payer: MEDICARE

## 2023-10-27 DIAGNOSIS — M70.62 TROCHANTERIC BURSITIS OF LEFT HIP: Primary | ICD-10-CM

## 2023-10-27 PROCEDURE — 97535 SELF CARE MNGMENT TRAINING: CPT | Performed by: PHYSICAL THERAPIST

## 2023-10-27 PROCEDURE — 97140 MANUAL THERAPY 1/> REGIONS: CPT | Performed by: PHYSICAL THERAPIST

## 2023-10-27 PROCEDURE — 97112 NEUROMUSCULAR REEDUCATION: CPT | Performed by: PHYSICAL THERAPIST

## 2023-10-27 PROCEDURE — 97110 THERAPEUTIC EXERCISES: CPT | Performed by: PHYSICAL THERAPIST

## 2023-10-30 NOTE — PROGRESS NOTES
Daily Note     Today's date: 10/31/2023  Patient name: Kvng Patrick  : [birth year]  MRN: [id number]  Referring provider: Manuela Card DO  Dx:   Encounter Diagnosis     ICD-10-CM    1. Trochanteric bursitis of left hip  M70.62                      Subjective: Patient reports that she tolerated her first session with no increase in her symptoms. "Just my usual soreness, but I did feel a little better right after PT."      Objective: See treatment diary below      Assessment: Tolerated treatment well. Patient exhibited good technique with therapeutic exercises and would benefit from continued PT to increase L hip ROM/strength and endurance to improve mobility and gait. Plan: Continue per plan of care.       Precautions:  PMH Right TKA, CKD, HTN   POC 23      Manuals 10/24 10/27 10/31     Left HS, hip ABD, quad, pirifformis, and gastroc with manual hip traction 5 min  10 min  10'     Left GTB rolling  5 min  5 min  5' STM                     Neuro Re-Ed   10/31     Bridge with ABD   2x10 Blue  2x10 Blue     BOSU March         BOSU 3 way SLR         BOSU Squat- Round Down        Side step and squat   2x30 Feet  6# Ball NV     Front and lateral lunge                 Ther Ex   10/31     [address] for ROM   10 min L1  10'L1     S/L Clam shells   2x10 Left Only Blue  2x10   L only Blue     S/L Hip ABD   2x10 Left Only  2x10   L only      Leg press DL and SL   2x10 Each   85# DL  45# SL  85#DL  45#SL  2x10ea                             HEP update/review  15 min  10 min       Ther Activity   10/31                     Gait Training   10/31                     Modalities   10/31     MHP PRN 15 min Left Hip  15' Lhip

## 2023-10-31 ENCOUNTER — OFFICE VISIT (OUTPATIENT)
Dept: PHYSICAL THERAPY | Facility: CLINIC | Age: 78
End: 2023-10-31
Payer: MEDICARE

## 2023-10-31 DIAGNOSIS — M70.62 TROCHANTERIC BURSITIS OF LEFT HIP: Primary | ICD-10-CM

## 2023-10-31 PROCEDURE — 97140 MANUAL THERAPY 1/> REGIONS: CPT

## 2023-10-31 PROCEDURE — 97112 NEUROMUSCULAR REEDUCATION: CPT

## 2023-10-31 PROCEDURE — 97110 THERAPEUTIC EXERCISES: CPT

## 2023-11-03 ENCOUNTER — OFFICE VISIT (OUTPATIENT)
Dept: PHYSICAL THERAPY | Facility: CLINIC | Age: 78
End: 2023-11-03
Payer: MEDICARE

## 2023-11-03 DIAGNOSIS — M70.62 TROCHANTERIC BURSITIS OF LEFT HIP: Primary | ICD-10-CM

## 2023-11-03 PROCEDURE — 97140 MANUAL THERAPY 1/> REGIONS: CPT

## 2023-11-03 PROCEDURE — 97110 THERAPEUTIC EXERCISES: CPT

## 2023-11-03 NOTE — PROGRESS NOTES
Daily Note     Today's date: 11/3/2023  Patient name: Franklin Davidson  :   MRN: [id number]  Referring provider: Namita Cary DO  Dx:   Encounter Diagnosis     ICD-10-CM    1. Trochanteric bursitis of left hip  M70.62                      Subjective: Patient reports that she had two days of relief post last session. Patient reports that her symptoms did return after the two days of relief, symptoms are persisting today. Objective: See treatment diary below      Assessment: Tolerated treatment well. Patient exhibited good technique with therapeutic exercises and would benefit from continued PT to increase L hip ROM/strength and endurance to improve mobility. Plan: Continue per plan of care.       Precautions:  PMH Right TKA, CKD, HTN   POC 23      Manuals 10/24 10/27 10/31 11/3    Left HS, hip ABD, quad, pirifformis, and gastroc with manual hip traction 5 min  10 min  10' 10'AK    Left GTB rolling  5 min  5 min  5' STM 5'AK                    Neuro Re-Ed   10/31 11/3    Bridge with ABD   2x10 Blue  2x10 Blue 2x10 Blue    BOSU March         BOSU 3 way SLR         BOSU Squat- Round Down        Side step and squat   2x30 Feet  6# Ball NV     Front and lateral lunge                 Ther Ex   10/31 11/3    [facility] for ROM   10 min L1  10'L1 10'L1    S/L Clam shells   2x10 Left Only Blue  2x10   L only Blue 2x10   L only Blue    S/L Hip ABD   2x10 Left Only  2x10   L only  2x10   Lonly    Leg press DL and SL   2x10 Each   85# DL  45# SL  85#DL  45#SL  2x10ea 85#DL  45#SL  2x10ea                            HEP update/review  15 min  10 min       Ther Activity   10/31 11/3                    Gait Training   10/31 11/3                    Modalities   10/31 11/3    MHP PRN 15 min Left Hip  15' Lhip 15' L hip

## 2023-11-06 NOTE — PROGRESS NOTES
Daily Note     Today's date: 2023  Patient name: Shabana Ortega  :   MRN: [id number]  Referring provider: Kendra Sandhu DO  Dx:   Encounter Diagnosis     ICD-10-CM    1. Trochanteric bursitis of left hip  M70.62                      Subjective:  Patient reports having a rough weekend with increase pain/soreness in the left hip but states feeling a little better this morning to 3/10 level. Patient reports continuation of difficulty with transfers and walking with the increase left hip symptoms. Patient did contact the MD and re-schedule new appointment for . Objective: See treatment diary below      Assessment: Tolerated treatment poor. PT notes continuation of decrease ROM and strength t/o the left hip and LE with demonstration of impaired gait pattern and balance with need for continuation of skilled therapy. PT notes patient with extreme pain and tenderness over the left GT and bursa during the session with minimal to change in status during the session. PT held all TE and referred to contact MD to re-schedule new appointment for further evaluation of the left hip. PT will continue with POC as per MD orders after f/u on . Plan: Continue per plan of care.       Precautions:  PMH Right TKA, CKD, HTN   POC 23      Manuals 10/24 10/27 10/31 11/3 11/6   Left HS, hip ABD, quad, pirifformis, and gastroc with manual hip traction 5 min  10 min  10' 10'AK 20 min    Left GTB rolling  5 min  5 min  5' STM 5'AK 5 min                    Neuro Re-Ed   10/31 11/3    Bridge with ABD   2x10 Blue  2x10 Blue 2x10 Blue    BOSU March         BOSU 3 way SLR         BOSU Squat- Round Down        Side step and squat   2x30 Feet  6# Ball NV     Front and lateral lunge                 Ther Ex   10/31 11/3    [address] for ROM   10 min L1  10'L1 10'L1 10 min L1    S/L Clam shells   2x10 Left Only Blue  2x10   L only Blue 2x10   L only Blue    S/L Hip ABD   2x10 Left Only  2x10   L only  2x10 Lonly    Leg press DL and SL   2x10 Each   85# DL  45# SL  85#DL  45#SL  2x10ea 85#DL  45#SL  2x10ea                            HEP update/review  15 min  10 min       Ther Activity   10/31 11/3                    Gait Training   10/31 11/3                    Modalities   10/31 11/3    MHP PRN 15 min Left Hip  15' Lhip 15' L hip 15 min CP to the left Hip

## 2023-11-07 ENCOUNTER — OFFICE VISIT (OUTPATIENT)
Dept: PHYSICAL THERAPY | Facility: CLINIC | Age: 78
End: 2023-11-07
Payer: MEDICARE

## 2023-11-07 DIAGNOSIS — M70.62 TROCHANTERIC BURSITIS OF LEFT HIP: Primary | ICD-10-CM

## 2023-11-07 PROCEDURE — 97140 MANUAL THERAPY 1/> REGIONS: CPT | Performed by: PHYSICAL THERAPIST

## 2023-11-07 PROCEDURE — 97110 THERAPEUTIC EXERCISES: CPT | Performed by: PHYSICAL THERAPIST

## 2023-11-08 ENCOUNTER — OFFICE VISIT (OUTPATIENT)
Dept: OBGYN CLINIC | Facility: CLINIC | Age: 78
End: 2023-11-08
Payer: MEDICARE

## 2023-11-08 VITALS
TEMPERATURE: 97.6 F | DIASTOLIC BLOOD PRESSURE: 72 MMHG | SYSTOLIC BLOOD PRESSURE: 130 MMHG | WEIGHT: 173 LBS | HEIGHT: 64 IN | BODY MASS INDEX: 29.53 KG/M2 | HEART RATE: 64 BPM

## 2023-11-08 DIAGNOSIS — M25.552 PAIN OF LEFT HIP: ICD-10-CM

## 2023-11-08 DIAGNOSIS — M70.62 TROCHANTERIC BURSITIS OF LEFT HIP: Primary | ICD-10-CM

## 2023-11-08 PROCEDURE — 99213 OFFICE O/P EST LOW 20 MIN: CPT | Performed by: ORTHOPAEDIC SURGERY

## 2023-11-08 NOTE — PROGRESS NOTES
ASSESSMENT/PLAN:    Diagnoses and all orders for this visit:    Trochanteric bursitis of left hip    Pain of left hip        Plan: I would recommend continuing physical therapy. At this time, it does appear that she has a combination of trochanteric bursitis and perhaps some external rotator inflammation. She is taking Celebrex once daily and may continue to do so but may also add Tylenol at 1000 mg 3 times daily if she chooses. I did not believe that repeat injection of the greater trochanteric bursa would be of great benefit since the initial 1 performed just 2 months ago did not provide her significant relief. I have tried to reassure her that this is a physical therapy problem and it may take a few months of therapy for resolution. She was informed that even if she were to have a tear of a muscle, surgical intervention generally would not be recommended at her age. Return for As scheduled. _____________________________________________________  CHIEF COMPLAINT:  Chief Complaint   Patient presents with    Follow-up         SUBJECTIVE:  Shabana Ortega is a 78y.o. year old female who presents for follow up of her left hip/greater trochanteric bursitis. She was seen about 3 weeks ago and was scheduled to return the end of this month. She states that she noted increased pain yesterday during physical therapy but overall symptoms have improved since yesterday. In the past 3 weeks, she has seen a very minor improvement in her symptoms. However, she continues to complain of pain over the lateral aspect of the proximal thigh, directly lateral to the greater trochanter and also somewhat posterior. She will experience occasional pain radiating down the lateral aspect of her thigh but this generally only last for a minute or 2 and then will resolve. She denies lower extremity paresthesias. She has attended physical therapy for the initial evaluation and 4 or 5 treatment sessions.       PAST MEDICAL HISTORY:  Past Medical History:   Diagnosis Date    Hypertension     Vertigo        PAST SURGICAL HISTORY:  Past Surgical History:   Procedure Laterality Date    APPENDECTOMY      HYSTERECTOMY      JOINT REPLACEMENT         FAMILY HISTORY:  History reviewed. No pertinent family history. SOCIAL HISTORY:  Social History     Tobacco Use    Smoking status: Never    Smokeless tobacco: Never   Vaping Use    Vaping Use: Never used   Substance Use Topics    Alcohol use: Yes     Comment: socially    Drug use: Not Currently       MEDICATIONS:    Current Outpatient Medications:     Ascorbic Acid (DUARTE-C PO), Take by mouth, Disp: , Rfl:     celecoxib (CeleBREX) 200 mg capsule, Take 1 capsule (200 mg total) by mouth daily, Disp: 30 capsule, Rfl: 1    cholecalciferol (VITAMIN D3) 1,000 units tablet, Take 5,000 Units by mouth daily, Disp: , Rfl:     hydrochlorothiazide (HYDRODIURIL) 25 mg tablet, Take 25 mg by mouth daily, Disp: , Rfl:     LORazepam (ATIVAN) 0.5 mg tablet, Take 0.5 mg by mouth daily at bedtime as needed, Disp: , Rfl:     metoprolol succinate (TOPROL-XL) 100 mg 24 hr tablet, Take 100 mg by mouth daily, Disp: , Rfl:     ramipril (ALTACE) 10 MG capsule, Take 1 capsule by mouth daily, Disp: , Rfl:     verapamil (VERELAN PM) 180 MG 24 hr capsule, Take 180 mg by mouth daily, Disp: , Rfl:     ALLERGIES:  Allergies   Allergen Reactions    Iodinated Contrast Media Other (See Comments) and Hives    Yellow Dye - Food Allergy Other (See Comments)       REVIEW OF SYSTEMS:  Pertinent items are noted in HPI. A comprehensive review of systems was negative.      _____________________________________________________  PHYSICAL EXAMINATION:  General: well developed and well nourished, alert, and appears comfortable  Psychiatric: Normal  HEENT:  Normocephalic, atraumatic  Cardiovascular:  Regular  Pulmonary: No wheezing or stridor  Skin: No masses, erthema, lacerations, fluctation, ulcerations  Neurovascular:  Motor and sensory exams are intact although resisted external rotation of her left hip triggers complaints of pain. Pulses are palpable. MUSCULOSKELETAL EXAMINATION:    The left hip exam, tested supine, elicited apprehension. With reassurance, I was able to flex her hip to 90 degrees. She was apprehensive with further flexion but with flexion to 110 degrees denied any aggravation of pain. With external rotation, she complains of pain in the posterolateral thigh which is decreased with internal rotation. During testing, when the hip was externally rotated in the flexed position, she demonstrated a verbal holler of pain. In prone testing, she denies any pain with hip rotation. She denies pain with resisted internal rotation but does complain of pain with resisted external rotation and demonstrated limited effort due to pain. She has tenderness over the left SI joint as well as the left external rotators at their insertion to the posterior trochanter. She has minimal tenderness over the greater trochanteric bursal region. The right hip was nontender over the external rotators insertion and the greater trochanter. She has no tenderness over the sciatic nerve in the buttocks or thigh.    _____________________________________________________  STUDIES REVIEWED:  Physical therapy notes were reviewed.             Namita Cary

## 2023-11-09 NOTE — PROGRESS NOTES
Daily Note     Today's date: 11/10/2023  Patient name: Akila Phillip  :   MRN: [id number]  Referring provider: Kathy Archibald DO  Dx:   Encounter Diagnosis     ICD-10-CM    1. Trochanteric bursitis of left hip  M70.62                      Subjective: Patient reports decrease in symptoms post last visit. Patient demonstrates improved gait upon arrival to PT. Objective: See treatment diary below      Assessment: Tolerated treatment well. PT notes continuation of decrease ROM and strength t/o the left hip and LE with demonstration of impaired gait pattern and balance with need for continuation of skilled therapy. PT notes patient did have f/u with MD yesterday for further evaluation of the left hip with recent flare up. MD noted no new findings in the left hip and referred patient back to OPPT to continue with treatment. Plan: Continue per plan of care.       Precautions:  PMH Right TKA, CKD, HTN   POC 23      Manuals 10/27 10/31 11/3 11/6 11/10   Left HS, hip ABD, quad, pirifformis, and gastroc with manual hip traction 10 min  10' 10'AK 20 min  20'   Left GTB rolling  5 min  5' STM 5'AK 5 min                     Neuro Re-Ed  10/31 11/3  11/10   Bridge with ABD  2x10 Blue  2x10 Blue 2x10 Blue  10xBlue   BOSU March         BOSU 3 way SLR         BOSU Squat- Round Down        Side step and squat  2x30 Feet  6# Ball NV      Front and lateral lunge                 Ther Ex  10/31 11/3  11/10   [address] for ROM  10 min L1  10'L1 10'L1 10 min L1  10'L3   S/L Clam shells  2x10 Left Only Blue  2x10   L only Blue 2x10   L only Blue  2x10   L only   S/L Hip ABD  2x10 Left Only  2x10   L only  2x10   Lonly     Leg press DL and SL  2x10 Each   85# DL  45# SL  85#DL  45#SL  2x10ea 85#DL  45#SL  2x10ea                             HEP update/review  10 min        Ther Activity  10/31 11/3  11/10                   Gait Training  10/31 11/3  11/10                   Modalities  10/31 11/3  11/10   MHP 15 min Left Hip  15' Lhip 15' L hip 15 min CP to the left Hip  15'CP to the L hip

## 2023-11-10 ENCOUNTER — OFFICE VISIT (OUTPATIENT)
Dept: PHYSICAL THERAPY | Facility: CLINIC | Age: 78
End: 2023-11-10
Payer: MEDICARE

## 2023-11-10 DIAGNOSIS — M70.62 TROCHANTERIC BURSITIS OF LEFT HIP: Primary | ICD-10-CM

## 2023-11-10 PROCEDURE — 97140 MANUAL THERAPY 1/> REGIONS: CPT

## 2023-11-10 PROCEDURE — 97112 NEUROMUSCULAR REEDUCATION: CPT

## 2023-11-10 PROCEDURE — 97110 THERAPEUTIC EXERCISES: CPT

## 2023-11-13 NOTE — PROGRESS NOTES
Daily Note     Today's date: 2023  Patient name: Gaetano Maxwell  :   MRN: [id number]  Referring provider: Kaleb Hunt DO  Dx:   Encounter Diagnosis     ICD-10-CM    1. Trochanteric bursitis of left hip  M70.62                      Subjective:  Patient reports feeling better the past few days with overall decrease symptoms to 2/10 level. Patient reports continuation of weakness in the left hip with difficulty with A/D stairs. Objective: See treatment diary below      Assessment: Tolerated treatment well. PT notes continuation of decrease ROM and strength t/o the left hip and LE with demonstration of impaired gait pattern and balance with need for continuation of skilled therapy. PT notes patient able to tolerate increase POC with no increase in left hip symptoms. Plan: Continue per plan of care.       Precautions:  PMH Right TKA, CKD, HTN   POC 23      Manuals 10/31 11/3 11/6 11/10 11/14   Left HS, hip ABD, quad, pirifformis, and gastroc with manual hip traction 10' 10'AK 20 min  20' 15 min    Left GTB rolling  5' STM 5'AK 5 min                      Neuro Re-Ed 10/31 11/3  11/10    Bridge with ABD  2x10 Blue 2x10 Blue  10xBlue 2x10 Blue    BOSU March         BOSU 3 way SLR         BOSU Squat- Round Down        Side step and squat  NV       Front and lateral lunge      2x10 Each Bilat LE    Front and retro step over      10x Bilat LE   6" step    Ther Ex 10/31 11/3  11/10    [address] for ROM  10'L1 10'L1 10 min L1  10'L3 10 min L5    S/L Clam shells  2x10   L only Blue 2x10   L only Blue  2x10   L only    S/L Hip ABD  2x10   L only  2x10   Lonly      Leg press DL and SL  85#DL  45#SL  2x10ea 85#DL  45#SL  2x10ea      Front and lateral step up      10x Each Bilat LE  6" step                    HEP update/review         Ther Activity 10/31 11/3  11/10                    Gait Training 10/31 11/3  11/10                    Modalities 10/31 11/3  11/10    MHP 15' Lhip 15' L hip 15 min CP to the left Hip  15'CP to the L hip 15 min MHP left hip in seated

## 2023-11-14 ENCOUNTER — OFFICE VISIT (OUTPATIENT)
Dept: PHYSICAL THERAPY | Facility: CLINIC | Age: 78
End: 2023-11-14
Payer: MEDICARE

## 2023-11-14 DIAGNOSIS — M70.62 TROCHANTERIC BURSITIS OF LEFT HIP: Primary | ICD-10-CM

## 2023-11-14 PROCEDURE — 97140 MANUAL THERAPY 1/> REGIONS: CPT | Performed by: PHYSICAL THERAPIST

## 2023-11-14 PROCEDURE — 97112 NEUROMUSCULAR REEDUCATION: CPT | Performed by: PHYSICAL THERAPIST

## 2023-11-14 PROCEDURE — 97110 THERAPEUTIC EXERCISES: CPT | Performed by: PHYSICAL THERAPIST

## 2023-11-16 NOTE — PROGRESS NOTES
PT Re-Evaluation     Today's date: 2023  Patient name: Buddy Villalta  : [birth year]  MRN: [id number]  Referring provider: Kayli Bonilla DO  Dx:   Encounter Diagnosis     ICD-10-CM    1. Trochanteric bursitis of left hip  M70.62                      Assessment  Assessment details: PT notes the patient with continuation of decrease ROM and strength t/o the left hip and LE with demonstration of improved gait pattern and balance leading to increase pain levels and functional limitations with need for continuation of skilled therapy for 2 weeks with focus on strengthening, manual therapy, gait/balance, analgesic modalities, and transition to HEP. Impairments: abnormal gait, abnormal or restricted ROM, activity intolerance, impaired balance, impaired physical strength, lacks appropriate home exercise program and pain with function  Understanding of Dx/Px/POC: fair   Prognosis: good    Goals  ST. Initiate HEP-MET  2. Decrease pain levels by 25-50%-MET   3. Increase ROM by 25-50%-MET  4. Increase strength by 1-2 mm grades-MET  LT. Demonstration of normal gait pattern and balance-MET  2. Decrease limitations with standing, walking and stairs-Progressing   3. Decrease limitations with lifting and carrying-Progressing  4. Decrease limitations with transfers and ADL-Progressing   5. DC with HEP-Progressing      Plan  Plan details: Transition to HEP.    Patient would benefit from: skilled physical therapy  Planned modality interventions: thermotherapy: hydrocollator packs  Planned therapy interventions: manual therapy, neuromuscular re-education, patient education, self care, strengthening, stretching, therapeutic exercise, balance, flexibility, gait training, graded exercise and home exercise program  Frequency: 2x week  Duration in weeks: 2  Treatment plan discussed with: patient        Subjective Evaluation    History of Present Illness  Mechanism of injury: Presently the patient has attended 8 sessions of skilled therapy and feels overall improvement since the start of therapy. Patient reports her pain levels are lower in the left hip with improved flexibility. Patient reports the hip is still feeling a little weak with inability to perform recreational activities with need for continuation of skilled therapy. Patient Goals  Patient goals for therapy: decreased pain, improved balance, increased motion, increased strength, independence with ADLs/IADLs and return to sport/leisure activities    Pain  Current pain ratin  At best pain ratin  At worst pain rating: 3  Location: Left hip  Quality: tight, pulling, discomfort and dull ache  Relieving factors: rest and medications  Aggravating factors: walking and stair climbing  Progression: improved      Diagnostic Tests  X-ray: normal  Treatments  Previous treatment: injection treatment and medication  Current treatment: medication and physical therapy        Objective     Palpation   Left   Tenderness of the rectus femoris, sartorius and TFL. Tenderness     Left Hip   Tenderness in the greater trochanter and iliac crest. No tenderness in the PSIS and sacroiliac joint. Neurological Testing     Reflexes   Left   Patellar (L4): normal (2+)  Achilles (S1): normal (2+)    Right   Patellar (L4): normal (2+)  Achilles (S1): normal (2+)    Active Range of Motion   Left Hip   Flexion: 57 degrees   Extension: WFL  Abduction: WFL  External rotation (90/90): 24 degrees   Internal rotation (90/90): 13 degrees   Left Knee   Normal active range of motion    Additional Active Range of Motion Details  PT notes decrease ROM and strength t/o the left hip and LE     Passive Range of Motion   Left Hip   Flexion: 65 degrees   Abduction: WFL  External rotation (90/90): 27 degrees   Internal rotation (90/90):  [facility]    Strength/Myotome Testing     Left Hip   Planes of Motion   Flexion: 4+  Extension: 5  Abduction: 4  Adduction: 5  External rotation: 4+  Internal rotation: 4+    Left Knee   Flexion: 5  Extension: 5  Quadriceps contraction: good    Tests     Left Hip   Positive Saravanan and piriformis. Negative MARELY and FADIR. Pino: Negative. Modified Pino: Negative. 90/90 SLR: Negative. SLR: Negative. Ambulation     Observational Gait   Gait: within functional limits   Walking speed, stride length and left stance time within functional limits.               Precautions:  PMH Right TKA, CKD, HTN   POC 12/17/23

## 2023-11-16 NOTE — PROGRESS NOTES
Daily Note     Today's date: 2023  Patient name: Balwinder Hdez  :   MRN: [id number]  Referring provider: Mp Almaraz DO  Dx:   Encounter Diagnosis     ICD-10-CM    1. Trochanteric bursitis of left hip  M70.62                      Subjective: Patient reports that she would like to get back to playing tennis. "I raked leaves for 3, 3 1/2 hours yesterday with only a little soreness later that night."      Objective: See treatment diary below      Assessment: Tolerated treatment well. Patient exhibited good technique with therapeutic exercises and would benefit from continued PT to increase L hip ROM/strength and endurance to improve mobility. Patient demonstrated a good tolerance to the progression of her program today. Plan: Continue per plan of care.       Precautions:  PMH Right TKA, CKD, HTN   POC 23      Manuals 11/3 11/6 11/10 11/14 11/17   Left HS, hip ABD, quad, pirifformis, and gastroc with manual hip traction 10'AK 20 min  20' 15 min  15'   Left GTB rolling  5'AK 5 min                       Neuro Re-Ed 11/3  11/10  11/17   Bridge with ABD  2x10 Blue  10xBlue 2x10 Blue  [id number]   BOSU March         BOSU 3 way SLR         BOSU Squat- Round Down        Side step and squat         Front and lateral lunge     2x10 Each Bilat LE     Front and retro step over     10x Bilat LE   6" step          [employer] [id number] c foam        Standing 3 Way SLR 10xea bilat   Ther Ex 11/3  11/10  11/17   [address] for ROM  10'L1 10 min L1  10'L3 10 min L5  10'L5   S/L Clam shells  2x10   L only Blue  2x10   L only  [id number] Blue   S/L Hip ABD  2x10   Lonly       Leg press DL and SL  85#DL  45#SL  2x10ea       Front and lateral step up     10x Each Bilat LE  6" step  2x10ea bilat 6"step                   HEP update/review         Ther Activity 11/3  11/10  11/17                   Gait Training 11/3  11/10  11/17                   Modalities 11/3  11/10  11/17   MHP 15' L hip 15 min CP to the left Hip  15'CP to the L hip 15 min MHP left hip in seated  15'MH to L hip in seated

## 2023-11-17 ENCOUNTER — EVALUATION (OUTPATIENT)
Dept: PHYSICAL THERAPY | Facility: CLINIC | Age: 78
End: 2023-11-17
Payer: MEDICARE

## 2023-11-17 DIAGNOSIS — M70.62 TROCHANTERIC BURSITIS OF LEFT HIP: Primary | ICD-10-CM

## 2023-11-17 PROCEDURE — 97140 MANUAL THERAPY 1/> REGIONS: CPT | Performed by: PHYSICAL THERAPIST

## 2023-11-17 PROCEDURE — 97110 THERAPEUTIC EXERCISES: CPT

## 2023-11-17 PROCEDURE — 97140 MANUAL THERAPY 1/> REGIONS: CPT

## 2023-11-17 PROCEDURE — 97112 NEUROMUSCULAR REEDUCATION: CPT

## 2023-11-20 DIAGNOSIS — F99 INSOMNIA DUE TO OTHER MENTAL DISORDER: Primary | ICD-10-CM

## 2023-11-20 DIAGNOSIS — F41.9 ANXIETY: ICD-10-CM

## 2023-11-20 DIAGNOSIS — F51.05 INSOMNIA DUE TO OTHER MENTAL DISORDER: Primary | ICD-10-CM

## 2023-11-20 RX ORDER — LORAZEPAM 0.5 MG/1
0.5 TABLET ORAL
Qty: 30 TABLET | Refills: 0 | Status: SHIPPED | OUTPATIENT
Start: 2023-11-20

## 2023-11-20 NOTE — PROGRESS NOTES
Daily Note     Today's date: 2023  Patient name: Milena Lugo  :   MRN: [id number]  Referring provider: Kelsey Guzman DO  Dx:   Encounter Diagnosis     ICD-10-CM    1. Trochanteric bursitis of left hip  M70.62                      Subjective:  Patient reports the hip is a little sore this morning to 2/10 level. Patient reports she is happy with ability to return to tennis/recreational activities. Patient stated no increase in symptoms with performance of POC. Objective: See treatment diary below      Assessment: Tolerated treatment well. PT notes continuation of decrease ROM and strength t/o the left hip and LE with demonstration of impaired gait pattern and balance with need for continuation of skilled therapy. PT notes progression of POC with focus on recreational activities with inclusion of tennis racket swings and side shuffling to mimic activity to see how patient responds to activity. Plan: Continue per plan of care.       Precautions:  PMH Right TKA, CKD, HTN   POC 23      Manuals 11/6 11/10 11/14 11/17 11/21   Left HS, hip ABD, quad, pirifformis, and gastroc with manual hip traction 20 min  20' 15 min  15' 15 min    Left GTB rolling  5 min                        Neuro Re-Ed  11/10  11/17    Bridge with ABD   10xBlue 2x10 Blue  [id number] 2x10 Blue    BOSU March      Monster walk   6x10 Feet   Red    BOSU 3 way SLR      Side shuffle with racket   6x30 Feet    BOSU Squat- Round Down     Shuffle up and back with racket swing   5 cones   2X    Side step and squat      Red   6x10 Feet    Front and lateral lunge    2x10 Each Bilat LE   1/2 Turn with racket swing   Fore and back   10x Each    Front and retro step over    10x Bilat LE   6" step   [place] Lateral Walk outs  5X Bilat  10#        [employer] [id number] c foam        Standing 3 Way SLR 10xea bilat    Ther Ex  11/10  11/17    [address] for ROM  10 min L1  10'L3 10 min L5  10'L5 10 min L5    S/L Clam shells   2x10   L only  [id number] Blue    S/L Hip ABD         Leg press DL and SL         Front and lateral step up    10x Each Bilat LE  6" step  2x10ea bilat 6"step                    HEP update/review         Ther Activity  11/10  11/17                    Gait Training  11/10  11/17                    Modalities  11/10  11/17    MHP 15 min CP to the left Hip  15'CP to the L hip 15 min MHP left hip in seated  15'MH to L hip in seated 15 min MHP Left hip in seated

## 2023-11-21 ENCOUNTER — OFFICE VISIT (OUTPATIENT)
Dept: PHYSICAL THERAPY | Facility: CLINIC | Age: 78
End: 2023-11-21
Payer: MEDICARE

## 2023-11-21 DIAGNOSIS — M70.62 TROCHANTERIC BURSITIS OF LEFT HIP: Primary | ICD-10-CM

## 2023-11-21 PROCEDURE — 97112 NEUROMUSCULAR REEDUCATION: CPT | Performed by: PHYSICAL THERAPIST

## 2023-11-21 PROCEDURE — 97140 MANUAL THERAPY 1/> REGIONS: CPT | Performed by: PHYSICAL THERAPIST

## 2023-11-21 NOTE — PROGRESS NOTES
Daily Note     Today's date: 2023  Patient name: Pushpa Phelps  :   MRN: [id number]  Referring provider: Jacki Chauhan DO  Dx:   Encounter Diagnosis     ICD-10-CM    1. Trochanteric bursitis of left hip  M70.62                      Subjective: Patient reports that she is noticing much improvement of her symptoms. "I only feel it when I'm walking up hill or going up the stairs. I don't feel pain just the sore spot back there."      Objective: See treatment diary below      Assessment: Tolerated treatment well. Patient exhibited good technique with therapeutic exercises and would benefit from continued PT to increase L hip ROM/strength and endurance to improve mobility. Plan: Continue per plan of care.       Precautions:  PMH Right TKA, CKD, HTN   POC 23      Manuals 11/10 11/14 11/17 11/21 11/24   Left HS, hip ABD, quad, pirifformis, and gastroc with manual hip traction 20' 15 min  15' 15 min  15'   Left GTB rolling                         Neuro Re-Ed 11/10  11/17  11/24   Bridge with ABD  10xBlue 2x10 Blue  [id number] 2x10 Blue     BOSU March     Monster walk   6x10 Feet   [employer] Walk 6x10' RTB   BOSU 3 way SLR     Side shuffle with racket   6x30 Feet  Side shuffle c racket 6x30'   BOSU Squat- Round Down    Shuffle up and back with racket swing   5 cones   2X  Shuffle up & back c racket swing 5cones 2x   Side step and squat     Red   6x10 Feet  6x10' RTB   Front and lateral lunge   2x10 Each Bilat LE   1/2 Turn with racket swing   Fore and back   10x Each  1/2 turn c racket swing fore & back   10x ea   Front and retro step over   10x Bilat LE   6" step   [place] Lateral Walk outs  5X Bilat  10#  [place] Lateral Walk Outs 5xbilat 10#      Fire Hydrants [id number] c foam        Standing 3 Way SLR 10xea bilat     Ther Ex 11/10  11/17  11/24   [address] [employer] for ROM  10'L3 10 min L5  10'L5 10 min L5  10'L5   S/L Clam shells  2x10   L only  [id number] Blue     S/L Hip ABD         Leg press DL and SL Front and lateral step up   10x Each Bilat LE  6" step  2x10ea bilat 6"step                     HEP update/review         Ther Activity 11/10  11/17  11/24                   Gait Training 11/10  11/17  11/24                   Modalities 11/10  11/17  11/24   MHP 15'CP to the L hip 15 min MHP left hip in seated  15'MH to L hip in seated 15 min MHP Left hip in seated  15'MHP Lhip in seated

## 2023-11-24 ENCOUNTER — OFFICE VISIT (OUTPATIENT)
Dept: PHYSICAL THERAPY | Facility: CLINIC | Age: 78
End: 2023-11-24
Payer: MEDICARE

## 2023-11-24 DIAGNOSIS — M70.62 TROCHANTERIC BURSITIS OF LEFT HIP: Primary | ICD-10-CM

## 2023-11-24 PROCEDURE — 97112 NEUROMUSCULAR REEDUCATION: CPT

## 2023-11-24 PROCEDURE — 97140 MANUAL THERAPY 1/> REGIONS: CPT

## 2023-11-24 PROCEDURE — 97110 THERAPEUTIC EXERCISES: CPT

## 2023-11-27 ENCOUNTER — OFFICE VISIT (OUTPATIENT)
Dept: OBGYN CLINIC | Facility: CLINIC | Age: 78
End: 2023-11-27
Payer: MEDICARE

## 2023-11-27 VITALS
TEMPERATURE: 97.5 F | DIASTOLIC BLOOD PRESSURE: 80 MMHG | WEIGHT: 174 LBS | BODY MASS INDEX: 29.71 KG/M2 | SYSTOLIC BLOOD PRESSURE: 140 MMHG | HEIGHT: 64 IN | HEART RATE: 66 BPM

## 2023-11-27 DIAGNOSIS — M25.552 PAIN OF LEFT HIP: ICD-10-CM

## 2023-11-27 DIAGNOSIS — M70.62 TROCHANTERIC BURSITIS OF LEFT HIP: Primary | ICD-10-CM

## 2023-11-27 PROCEDURE — 99213 OFFICE O/P EST LOW 20 MIN: CPT | Performed by: ORTHOPAEDIC SURGERY

## 2023-11-27 NOTE — PROGRESS NOTES
ASSESSMENT/PLAN:    Diagnoses and all orders for this visit:    Trochanteric bursitis of left hip    Pain of left hip      Plan: At this time, it does appear that she has a combination of trochanteric bursitis and perhaps some external rotator inflammation. She is taking Celebrex once daily and may continue to do so. I would recommend continuing physical therapy for the remainder of her scheduled visits for the next 2 - 3 weeks. Discussed with the patient the progression from outpatient physical therapy to a HEP. Explained to the patient that if she feels that physical therapy has not resolved her symptoms to her liking, she will need to return for further evaluation. The patient expresses understanding and is in agreement with today's treatment plan. Return if symptoms worsen or fail to improve.      _____________________________________________________  CHIEF COMPLAINT:  Chief Complaint   Patient presents with    Left Hip - Follow-up    Follow-up         SUBJECTIVE:  Keyshawn Eller is a 78y.o. year old female who presents for follow up of her left hip/greater trochanteric bursitis. She was seen about 3 weeks ago. She states that she noted increased pain on Friday when she fell while carrying 2 gallons of water, but overall symptoms have improved since Friday. In the past 3 weeks, she has seen a very minor improvement in her symptoms. However, she continues to complain of pain over the lateral aspect of the proximal thigh, directly lateral to the greater trochanter with ambulating up stairs and walking on an incline. She denies lower extremity paresthesias. She has attended physical therapy and will continue to finish out the scheduled appointments for the next 3 weeks. She reports that overall the pain has decreased and she has returned to daily activities, to include tennis.           PAST MEDICAL HISTORY:  Past Medical History:   Diagnosis Date    Hypertension     Vertigo        PAST SURGICAL HISTORY:  Past Surgical History:   Procedure Laterality Date    APPENDECTOMY      HYSTERECTOMY      JOINT REPLACEMENT         FAMILY HISTORY:  History reviewed. No pertinent family history. SOCIAL HISTORY:  Social History     Tobacco Use    Smoking status: Never    Smokeless tobacco: Never   Vaping Use    Vaping Use: Never used   Substance Use Topics    Alcohol use: Yes     Comment: socially    Drug use: Not Currently       MEDICATIONS:    Current Outpatient Medications:     Ascorbic Acid (DUARTE-C PO), Take by mouth, Disp: , Rfl:     celecoxib (CeleBREX) 200 mg capsule, Take 1 capsule (200 mg total) by mouth daily, Disp: 30 capsule, Rfl: 1    cholecalciferol (VITAMIN D3) 1,000 units tablet, Take 5,000 Units by mouth daily, Disp: , Rfl:     hydrochlorothiazide (HYDRODIURIL) 25 mg tablet, Take 25 mg by mouth daily, Disp: , Rfl:     metoprolol succinate (TOPROL-XL) 100 mg 24 hr tablet, Take 100 mg by mouth daily, Disp: , Rfl:     ramipril (ALTACE) 10 MG capsule, Take 1 capsule by mouth daily, Disp: , Rfl:     verapamil (VERELAN PM) 180 MG 24 hr capsule, Take 180 mg by mouth daily, Disp: , Rfl:     LORazepam (ATIVAN) 0.5 mg tablet, Take 1 tablet (0.5 mg total) by mouth daily at bedtime as needed for anxiety (Patient not taking: Reported on 11/27/2023), Disp: 30 tablet, Rfl: 0    ALLERGIES:  Allergies   Allergen Reactions    Iodinated Contrast Media Other (See Comments) and Hives    Yellow Dye - Food Allergy Other (See Comments)       REVIEW OF SYSTEMS:  Pertinent items are noted in HPI. A comprehensive review of systems was negative.      _____________________________________________________  PHYSICAL EXAMINATION:  General: well developed and well nourished, alert, and appears comfortable  Psychiatric: Normal  HEENT:  Normocephalic, atraumatic  Cardiovascular:  Regular  Pulmonary: No wheezing or stridor  Skin: No masses, erthema, lacerations, fluctation, ulcerations  Neurovascular:  Motor and sensory exams are intact although resisted external rotation of her left hip triggers complaints of pain. Pulses are palpable. MUSCULOSKELETAL EXAMINATION:  left Hip -  Patient presents with no anatomical deformity  Ambulates with steady gait pattern  Uses No assistive devices  No tenderness upon palpation  ROM: 120 supine IR, 30 prone ER. 100 supine hip flexion  MMT: 3+/5 supine hip flexion  Knee flexor and extensor mechanism intact  Ankle DF and PF mechanism intact  No Leg Length Discrepancy   2+ TP and DP pulses with brisk capillary refill to the toes  Sural, saphenous, tibial, superficial and deep peroneal motor and sensory distribution intact  Sensation to light touch       _____________________________________________________  STUDIES REVIEWED:  Physical therapy notes were reviewed.             Scribe Attestation      I,:  Vic Matute am acting as a scribe while in the presence of the attending physician.:       I,:  Mian Huerta personally performed the services described in this documentation    as scribed in my presence.:

## 2023-11-28 ENCOUNTER — OFFICE VISIT (OUTPATIENT)
Dept: PHYSICAL THERAPY | Facility: CLINIC | Age: 78
End: 2023-11-28
Payer: MEDICARE

## 2023-11-28 DIAGNOSIS — M70.62 TROCHANTERIC BURSITIS OF LEFT HIP: Primary | ICD-10-CM

## 2023-11-28 PROCEDURE — 97140 MANUAL THERAPY 1/> REGIONS: CPT

## 2023-11-28 PROCEDURE — 97110 THERAPEUTIC EXERCISES: CPT

## 2023-11-28 PROCEDURE — 97112 NEUROMUSCULAR REEDUCATION: CPT

## 2023-11-28 NOTE — PROGRESS NOTES
Daily Note     Today's date: 2023  Patient name: Buddy Villalta  : [birth year]  MRN: [id number]  Referring provider: Kayli Bonilla DO  Dx:   Encounter Diagnosis     ICD-10-CM    1. Trochanteric bursitis of left hip  M70.62                      Subjective: Patient reports that she was able to play 2 sets of tennis with only mild soreness in her hip post exercise. "We even won both sets! I was a little fearful to run, but I did well."      Objective: See treatment diary below      Assessment: Tolerated treatment well. Patient exhibited good technique with therapeutic exercises and would benefit from continued PT to increase L hip ROM/strength and endurance to improve mobility and gait. Plan: Continue per plan of care.       Precautions:  PMH Right TKA, CKD, HTN   POC 23      Manuals    Left HS, hip ABD, quad, pirifformis, and gastroc with manual hip traction 15 min  15' 15 min  15' 15'   Left GTB rolling                         Neuro Re-Ed     Bridge with ABD  2x10 Blue  [id number] 2x10 [facility] March    Monster walk   6x10 Feet   [employer] Walk 6x10' RTB Monster Walk 6x10' RTB   BOSU 3 way SLR    Side shuffle with racket   6x30 Feet  Side shuffle c racket 6x30' Side shuffle c GWB 20'   BOSU Squat- Round Down   Shuffle up and back with racket swing   5 cones   2X  Shuffle up & back c racket swing 5cones 2x Shuffle up & back c racket swing 5cones 2x   Side step and squat    Red   6x10 Feet  6x10' RTB 6x10' RTB   Front and lateral lunge  2x10 Each Bilat LE   1/2 Turn with racket swing   Fore and back   10x Each  1/2 turn c racket swing fore & back   10x ea 1/2 turn c GWB swing fore & back   10x ea   Front and retro step over  10x Bilat LE   6" step   [place] Lateral Walk outs  5X Bilat  10#  [place] Lateral Walk Outs 5xbilat 10# [place] Lateral Walk Outs 10#5x bilat     [employer] [id number] c foam        Standing 3 Way SLR 10xea bilat      Ther Ex  11/17 11/24 11/28   [address] for ROM  10 min L5  10'L5 10 min L5  10'L5 10'L5   S/L Clam shells   [id number] Blue      S/L Hip ABD         Leg press DL and SL         Front and lateral step up  10x Each Bilat LE  6" step  2x10ea bilat 6"step                      HEP update/review         Ther Activity  11/17 11/24 11/28                   Gait Training  11/17 11/24 11/28                   Modalities  11/17 11/24 11/28   MHP 15 min MHP left hip in seated  15'MH to L hip in seated 15 min MHP Left hip in seated  15'MHP Lhip in seated 15'MHP L hip in seated

## 2023-11-30 NOTE — PROGRESS NOTES
Daily Note     Today's date: 2023  Patient name: Omid Alford  :   MRN: [id number]  Referring provider: Jillian Soliz DO  Dx:   Encounter Diagnosis     ICD-10-CM    1. Trochanteric bursitis of left hip  M70.62                      Subjective:  Patient reports the hip is a little sore after playing tennis earlier in the week but overall happy with the ability return to recreational activities. Objective: See treatment diary below      Assessment: Tolerated treatment well. PT notes continuation of decrease ROM and strength t/o the left hip and LE with demonstration of impaired gait pattern and balance with need for continuation of skilled therapy. Plan: Continue per plan of care.       Precautions:  PMH Right TKA, CKD, HTN   POC 23      Manuals    Left HS, hip ABD, quad, pirifformis, and gastroc with manual hip traction 15' 15 min  15' 15' 15 min    Left GTB rolling                         Neuro Re-Ed     Bridge with ABD  0g47Intq 2x10 Blue    2x10 [employer] March   Monster walk   6x10 Feet   [employer] Walk 6x10' RTB Monster Walk 6x10' RTB 6x10 Feet   Green    BOSU 3 way SLR   Side shuffle with racket   6x30 Feet  Side shuffle c racket 6x30' Side shuffle c GWB 20' Side step and squat   4x30 Feet   6# Ball    BOSU Squat- Round Down  Shuffle up and back with racket swing   5 cones   2X  Shuffle up & back c racket swing 5cones 2x Shuffle up & back c racket swing 5cones 2x Lunge walk   4x30 Feet   6# Ball    Side step and squat   Red   6x10 Feet  6x10' RTB 6x10' RTB DC   Front and lateral lunge   1/2 Turn with racket swing   Fore and back   10x Each  1/2 turn c racket swing fore & back   10x ea 1/2 turn c GWB swing fore & back   10x ea NT    Front and retro step over   [employer] Lateral Walk outs  5X Bilat  10#  [employer] Lateral Walk Outs 5xbilat 10# [place] Lateral Walk Outs 10#5x bilat 12#   5X Bilat     [employer] [id number] c foam    Push/pull cart   3x30 Feet   15#     Standing 3 Way SLR 10xea bilat       Ther Ex 11/17 11/24 11/28    [address] for ROM  10'L5 10 min L5  10'L5 10'L5 10 min L5    S/L Clam shells  [id number] Blue       S/L Hip ABD         Leg press DL and SL         Front and lateral step up  2x10ea bilat 6"step                       HEP update/review         Ther Activity 11/17 11/24 11/28                    Gait Training 11/17 11/24 11/28                    Modalities 11/17 11/24 11/28    MHP 15'MH to L hip in seated 15 min MHP Left hip in seated  15'MHP Lhip in seated 15'MHP L hip in seated  10 min CP left Hip

## 2023-12-01 ENCOUNTER — TELEPHONE (OUTPATIENT)
Dept: ADMINISTRATIVE | Facility: OTHER | Age: 78
End: 2023-12-01

## 2023-12-01 ENCOUNTER — OFFICE VISIT (OUTPATIENT)
Dept: PHYSICAL THERAPY | Facility: CLINIC | Age: 78
End: 2023-12-01
Payer: MEDICARE

## 2023-12-01 DIAGNOSIS — M70.62 TROCHANTERIC BURSITIS OF LEFT HIP: Primary | ICD-10-CM

## 2023-12-01 PROCEDURE — 97112 NEUROMUSCULAR REEDUCATION: CPT | Performed by: PHYSICAL THERAPIST

## 2023-12-01 PROCEDURE — 97140 MANUAL THERAPY 1/> REGIONS: CPT | Performed by: PHYSICAL THERAPIST

## 2023-12-01 PROCEDURE — 97110 THERAPEUTIC EXERCISES: CPT | Performed by: PHYSICAL THERAPIST

## 2023-12-01 NOTE — PROGRESS NOTES
Daily Note     Today's date: 2023  Patient name: Benton Herrera  :   MRN: [id number]  Referring provider: Rian Hurt DO  Dx:   Encounter Diagnosis     ICD-10-CM    1. Trochanteric bursitis of left hip  M70.62                      Subjective: Patient reports that her tennis game last evening went well again. "I did well no problem."      Objective: See treatment diary below      Assessment: Tolerated treatment well. Patient exhibited good technique with therapeutic exercises and would benefit from continued PT to increase L hip ROM/strength and endurance to improve mobility. Plan: Continue per plan of care.       Precautions:  PMH Right TKA, CKD, HTN   POC 23      Manuals    Left HS, hip ABD, quad, pirifformis, and gastroc with manual hip traction 15 min  15' 15' 15 min  15'   Left GTB rolling                         Neuro Re-Ed     Bridge with ABD  2x10 Blue    2x10 [employer] March  Monster walk   6x10 Feet   [employer]  Monster Walk 6x10' RTB Monster Walk 6x10' RTB 6x10 Feet   Green  6x10' GTB   BOSU 3 way SLR  Side shuffle with racket   6x30 Feet  Side shuffle c racket 6x30' Side shuffle c GWB 20' Side step and squat   4x30 Feet   6# Ball  SS & Squat 4x30' 6#WB   BOSU Squat- Round Down Shuffle up and back with racket swing   5 cones   2X  Shuffle up & back c racket swing 5cones 2x Shuffle up & back c racket swing 5cones 2x Lunge walk   4x30 Feet   6# Ball  [employer] Walk 4x30' 6#WB   Side step and squat  Red   6x10 Feet  6x10' RTB 6x10' RTB DC    Front and lateral lunge  1/2 Turn with racket swing   Fore and back   10x Each  1/2 turn c racket swing fore & back   10x ea 1/2 turn c GWB swing fore & back   10x ea NT     Front and retro step over  [employer] Lateral Walk outs  5X Bilat  10#  [employer] Lateral Walk Outs 5xbilat 10# [place] Lateral Walk Outs 10#5x bilat 12#   5X Bilat  12# 5xbilat       Push/pull cart   3x30 Feet   15#  Push/Pull Cart 3x30'  15#           Ther Ex  11/24 11/28 12/5   [address] [employer] for ROM  10 min L5  10'L5 10'L5 10 min L5  10'L5   S/L Clam shells         S/L Hip ABD         Leg press DL and SL         Front and lateral step up                         HEP update/review         Ther Activity  11/24 11/28 12/5                   Gait Training  11/24 11/28 12/5                   Modalities  11/24 11/28 12/5   MHP 15 min MHP Left hip in seated  15'MHP Lhip in seated 15'MHP L hip in seated  10 min CP left Hip  15'MH to L hip in seated

## 2023-12-01 NOTE — TELEPHONE ENCOUNTER
----- Message from Jakob Mendes sent at 12/1/2023  9:42 AM EST -----  Regarding: Harvey Pacheco  12/01/23 9:43 AM    Hello, our patient Alison Ellison has had DEXA Scan completed/performed. Please assist in updating the patient chart by pulling the Care Everywhere (CE) document. The date of service is 2/7/2020.      Thank you,  Jakob Mendes  [facility] PRIMARY [facility]

## 2023-12-01 NOTE — TELEPHONE ENCOUNTER
Upon review of the In Basket request we were able to locate, review, and update the patient chart as requested for DEXA Scan. Any additional questions or concerns should be emailed to the Practice Liaisons via the appropriate education email address, please do not reply via In Basket.     Thank you  Joo Macias

## 2023-12-05 ENCOUNTER — OFFICE VISIT (OUTPATIENT)
Dept: PHYSICAL THERAPY | Facility: CLINIC | Age: 78
End: 2023-12-05
Payer: MEDICARE

## 2023-12-05 DIAGNOSIS — M70.62 TROCHANTERIC BURSITIS OF LEFT HIP: Primary | ICD-10-CM

## 2023-12-05 PROCEDURE — 97110 THERAPEUTIC EXERCISES: CPT

## 2023-12-05 PROCEDURE — 97140 MANUAL THERAPY 1/> REGIONS: CPT

## 2023-12-05 PROCEDURE — 97112 NEUROMUSCULAR REEDUCATION: CPT

## 2023-12-07 NOTE — PROGRESS NOTES
Daily Note     Today's date: 2023  Patient name: Nancy Lawrence  :   MRN: [id number]  Referring provider: Shelly Beck DO  Dx:   Encounter Diagnosis     ICD-10-CM    1. Trochanteric bursitis of left hip  M70.62           Start Time:   Stop Time: 0840  Total time in clinic (min): 50 minutes    Subjective: Patient reports she is feeling pretty good and is not having pain in her left hip when she negotiates stairs. Objective: See treatment diary below      Assessment: Tolerated treatment well. Patient demonstrated fatigue post treatment, exhibited good technique with therapeutic exercises, and would benefit from continued PT to improve L hip strength and mobility in pain free range to improve functional mobility and QOL. Plan: Continue per plan of care. Progress treatment as tolerated.        Precautions:  PMH Right TKA, CKD, HTN   POC 23      Manuals    Left HS, hip ABD, quad, pirifformis, and gastroc with manual hip traction 15' 15' 15 min  15' 15'   Left GTB rolling                         Neuro Re-Ed     Bridge with ABD    2x10 Blue   2x10 BTB   BOSU March  Monster Walk 6x10' RTB Monster Walk 6x10' RTB 6x10 Feet   Green  6x10' GTB 6x10' GTB   BOSU 3 way SLR  Side shuffle c racket 6x30' Side shuffle c GWB 20' Side step and squat   4x30 Feet   6# Ball  SS & Squat 4x30' 6#WB SS and squat   4x30'  6#WB   BOSU Squat- Round Down Shuffle up & back c racket swing 5cones 2x Shuffle up & back c racket swing 5cones 2x Lunge walk   4x30 Feet   6# Ball  Lunge Walk 4x30' 6#WB Lunge Walk   4x30'  6#WB   Side step and squat  6x10' RTB 6x10' RTB DC     Front and lateral lunge  1/2 turn c racket swing fore & back   10x ea 1/2 turn c GWB swing fore & back   10x ea NT      Front and retro step over  [employer] Lateral Walk Outs 5xbilat 10# [employer] Lateral Walk Outs 10#5x bilat 12#   5X Bilat  12# 5xbilat NT      Push/pull cart   3x30 Feet   15#  Push/Pull Cart 3x30'  15# Push/Pull cart 3x30' 20#           Ther Ex 11/24 11/28 12/5    [facility] for ROM  10'L5 10'L5 10 min L5  10'L5 10' L6   S/L Clam shells         S/L Hip ABD         Leg press DL and SL         Front and lateral step up                         HEP update/review         Ther Activity 11/24 11/28 12/5                    Gait Training 11/24 11/28 12/5                    Modalities 11/24 11/28 12/5 12/8   MHP 15'MHP Lhip in seated 15'MHP L hip in seated  10 min CP left Hip  15'MH to L hip in seated Declined

## 2023-12-08 ENCOUNTER — OFFICE VISIT (OUTPATIENT)
Dept: PHYSICAL THERAPY | Facility: CLINIC | Age: 78
End: 2023-12-08
Payer: MEDICARE

## 2023-12-08 DIAGNOSIS — M70.62 TROCHANTERIC BURSITIS OF LEFT HIP: Primary | ICD-10-CM

## 2023-12-08 PROCEDURE — 97110 THERAPEUTIC EXERCISES: CPT

## 2023-12-08 PROCEDURE — 97112 NEUROMUSCULAR REEDUCATION: CPT

## 2023-12-08 PROCEDURE — 97140 MANUAL THERAPY 1/> REGIONS: CPT

## 2023-12-11 NOTE — PROGRESS NOTES
Daily Note     Today's date: 2023  Patient name: Tammy Dill  :   MRN: [id number]  Referring provider: Mikel Mendez DO  Dx:   Encounter Diagnosis     ICD-10-CM    1. Trochanteric bursitis of left hip  M70.62                      Subjective: Patient reports that she is very pleased with the progress she's made with her OP PT. Objective: See treatment diary below      Assessment: Tolerated treatment well. Patient to transition from skilled rehab to HEP today. Patient educated on and demonstrated a good understanding of her HEP. Patient compliance with HEP will help patient to maintain gains made in OP PT. Plan: Potential discharge next visit.      Precautions:  PMH Right TKA, CKD, HTN   POC 23      Manuals    Left HS, hip ABD, quad, pirifformis, and gastroc with manual hip traction 15' 15 min  15' 15' 15'   Left GTB rolling                         Neuro Re-Ed    Bridge with ABD   2x10 Blue   2x10 BTB    BOSU March  Monster Walk 6x10' RTB 6x10 Feet   Green  6x10' GTB 6x10' GTB    BOSU 3 way SLR  Side shuffle c GWB 20' Side step and squat   4x30 Feet   6# Ball  SS & Squat 4x30' 6#WB SS and squat   4x30'  6#WB    BOSU Squat- Round Down Shuffle up & back c racket swing 5cones 2x Lunge walk   4x30 Feet   6# Ball  Lunge Walk 4x30' 6#WB Lunge Walk   4x30'  6#WB    Side step and squat  6x10' RTB DC      Front and lateral lunge  1/2 turn c GWB swing fore & back   10x ea NT       Front and retro step over  [place] Lateral Walk Outs 10#5x bilat 12#   5X Bilat  12# 5xbilat NT      Push/pull cart   3x30 Feet   15#  Push/Pull Cart 3x30'  15# Push/Pull cart 3x30' 20#            Ther Ex    [employer] for ROM  10'L5 10 min L5  10'L5 10' L6 10'L6   S/L Clam shells         S/L Hip ABD         Leg press DL and SL         Front and lateral step up                         HEP update/review      20'   Ther Activity  Gait Training 11/28 12/5 12/12                   Modalities 11/28 12/5 12/8 12/12   MHP 15'MHP L hip in seated  10 min CP left Hip  15'MH to L hip in seated Declined

## 2023-12-11 NOTE — PROGRESS NOTES
PT Discharge    Today's date: 2023  Patient name: Benton Herrera  :   MRN: [id number]  Referring provider: Rian Hurt DO  Dx:   Encounter Diagnosis     ICD-10-CM    1. Trochanteric bursitis of left hip  M70.62                      Assessment  Assessment details: PT notes the patient with improved ROM and strength t/o the left hip and LE with demonstration of improved gait pattern and balance. PT notes the patient has met all therapy goals and is ready for a transition to HEP. Impairments: abnormal gait, abnormal or restricted ROM, activity intolerance, impaired balance, impaired physical strength, lacks appropriate home exercise program and pain with function  Understanding of Dx/Px/POC: fair   Prognosis: good    Goals  ST. Initiate HEP-MET  2. Decrease pain levels by 25-50%-MET   3. Increase ROM by 25-50%-MET  4. Increase strength by 1-2 mm grades-MET  LT. Demonstration of normal gait pattern and balance-MET  2. Decrease limitations with standing, walking and stairs-Progressing   3. Decrease limitations with lifting and carrying-Progressing  4. Decrease limitations with transfers and ADL-Progressing   5. DC with HEP-Progressing      Plan  Plan details: Transition to HEP. Patient would benefit from: skilled physical therapy  Planned modality interventions: thermotherapy: hydrocollator packs  Planned therapy interventions: manual therapy, neuromuscular re-education, patient education, self care, strengthening, stretching, therapeutic exercise, balance, flexibility, gait training, graded exercise and home exercise program  Frequency: 2x week  Duration in weeks: 1  Treatment plan discussed with: patient        Subjective Evaluation    History of Present Illness  Mechanism of injury: Presently the patient has attended multiple sessions of skilled therapy and feels overall improvement since the start of therapy.   Patient reports her pain levels are lower in the left hip with improved flexibility. Patient reports the hip is still feeling a little weak with inability to perform recreational activities with need for continuation of skilled therapy. Patient Goals  Patient goals for therapy: decreased pain, improved balance, increased motion, increased strength, independence with ADLs/IADLs and return to sport/leisure activities    Pain  Current pain ratin  At best pain ratin  At worst pain rating: 3  Location: Left hip  Quality: tight, pulling, discomfort and dull ache  Relieving factors: rest and medications  Aggravating factors: walking and stair climbing  Progression: improved      Diagnostic Tests  X-ray: normal  Treatments  Previous treatment: injection treatment and medication  Current treatment: medication and physical therapy        Objective     Tenderness     Left Hip   No tenderness in the PSIS, greater trochanter, iliac crest and sacroiliac joint. Neurological Testing     Reflexes   Left   Patellar (L4): normal (2+)  Achilles (S1): normal (2+)    Right   Patellar (L4): normal (2+)  Achilles (S1): normal (2+)    Active Range of Motion   Left Hip   Normal active range of motion  Left Knee   Normal active range of motion    Strength/Myotome Testing     Left Hip   Planes of Motion   Flexion: 5  Extension: 5  Abduction: 5  Adduction: 5  External rotation: 5  Internal rotation: 5    Left Knee   Flexion: 5  Extension: 5  Quadriceps contraction: good    Tests     Left Hip   Negative MARELY, FADIR, Saravanan and piriformis. Pino: Negative. Modified Pino: Negative. 90/90 SLR: Negative. SLR: Negative. Ambulation     Observational Gait   Gait: within functional limits   Walking speed, stride length and left stance time within functional limits.               Precautions:  PMH Right TKA, CKD, HTN   POC 23

## 2023-12-12 ENCOUNTER — OFFICE VISIT (OUTPATIENT)
Dept: PHYSICAL THERAPY | Facility: CLINIC | Age: 78
End: 2023-12-12
Payer: MEDICARE

## 2023-12-12 DIAGNOSIS — M70.62 TROCHANTERIC BURSITIS OF LEFT HIP: Primary | ICD-10-CM

## 2023-12-12 PROCEDURE — 97140 MANUAL THERAPY 1/> REGIONS: CPT

## 2023-12-12 PROCEDURE — 97140 MANUAL THERAPY 1/> REGIONS: CPT | Performed by: PHYSICAL THERAPIST

## 2023-12-12 PROCEDURE — 97535 SELF CARE MNGMENT TRAINING: CPT

## 2023-12-12 PROCEDURE — 97110 THERAPEUTIC EXERCISES: CPT

## 2023-12-15 ENCOUNTER — APPOINTMENT (OUTPATIENT)
Dept: PHYSICAL THERAPY | Facility: CLINIC | Age: 78
End: 2023-12-15
Payer: MEDICARE

## 2023-12-16 DIAGNOSIS — M70.62 TROCHANTERIC BURSITIS OF LEFT HIP: ICD-10-CM

## 2023-12-18 RX ORDER — CELECOXIB 200 MG/1
200 CAPSULE ORAL DAILY
Qty: 30 CAPSULE | Refills: 1 | Status: SHIPPED | OUTPATIENT
Start: 2023-12-18

## 2023-12-20 DIAGNOSIS — I10 ESSENTIAL HYPERTENSION: Primary | ICD-10-CM

## 2023-12-20 RX ORDER — HYDROCHLOROTHIAZIDE 25 MG/1
25 TABLET ORAL DAILY
Qty: 90 TABLET | Refills: 3 | Status: SHIPPED | OUTPATIENT
Start: 2023-12-20

## 2023-12-20 RX ORDER — RAMIPRIL 10 MG/1
10 CAPSULE ORAL DAILY
Qty: 90 CAPSULE | Refills: 3 | Status: SHIPPED | OUTPATIENT
Start: 2023-12-20

## 2023-12-20 RX ORDER — METOPROLOL SUCCINATE 100 MG/1
100 TABLET, EXTENDED RELEASE ORAL DAILY
Qty: 90 TABLET | Refills: 3 | Status: SHIPPED | OUTPATIENT
Start: 2023-12-20

## 2023-12-20 RX ORDER — VERAPAMIL HYDROCHLORIDE 180 MG/1
180 CAPSULE, EXTENDED RELEASE ORAL DAILY
Qty: 90 CAPSULE | Refills: 3 | Status: SHIPPED | OUTPATIENT
Start: 2023-12-20

## 2024-03-15 DIAGNOSIS — M70.62 TROCHANTERIC BURSITIS OF LEFT HIP: ICD-10-CM

## 2024-03-15 RX ORDER — CELECOXIB 200 MG/1
200 CAPSULE ORAL DAILY
Qty: 90 CAPSULE | Refills: 1 | Status: SHIPPED | OUTPATIENT
Start: 2024-03-15

## 2024-03-15 NOTE — TELEPHONE ENCOUNTER
Reason for call:   [x] Refill   [] Prior Auth  [] Other:     Office:   [x] PCP/Provider - Patient is now seeing Dr Sri Holland  [] Specialty/Provider -     Medication: celecoxib (CeleBREX) 200 mg capsule     Dose/Frequency: TAKE 1 CAPSULE BY MOUTH EVERY DAY     Quantity: 90 + 1 refill    Pharmacy: [facility]/pharmacy #[id number] [address]     Does the patient have enough for 3 days?   [] Yes   [x] No - Send as HP to POD

## 2024-03-25 ENCOUNTER — OFFICE VISIT (OUTPATIENT)
Dept: FAMILY MEDICINE CLINIC | Facility: CLINIC | Age: 79
End: 2024-03-25
Payer: MEDICARE

## 2024-03-25 VITALS
WEIGHT: 176.37 LBS | BODY MASS INDEX: 30.27 KG/M2 | SYSTOLIC BLOOD PRESSURE: 131 MMHG | DIASTOLIC BLOOD PRESSURE: 93 MMHG | OXYGEN SATURATION: 96 %

## 2024-03-25 DIAGNOSIS — K86.2 PANCREATIC CYST: ICD-10-CM

## 2024-03-25 DIAGNOSIS — K76.89 HEPATIC CYST: ICD-10-CM

## 2024-03-25 DIAGNOSIS — F41.9 ANXIETY: ICD-10-CM

## 2024-03-25 DIAGNOSIS — Z78.0 POSTMENOPAUSAL: ICD-10-CM

## 2024-03-25 DIAGNOSIS — M25.50 POLYARTHRALGIA: ICD-10-CM

## 2024-03-25 DIAGNOSIS — F99 INSOMNIA DUE TO OTHER MENTAL DISORDER: ICD-10-CM

## 2024-03-25 DIAGNOSIS — Z00.00 MEDICARE ANNUAL WELLNESS VISIT, SUBSEQUENT: Primary | ICD-10-CM

## 2024-03-25 DIAGNOSIS — E55.9 VITAMIN D DEFICIENCY: ICD-10-CM

## 2024-03-25 DIAGNOSIS — K59.09 CHRONIC CONSTIPATION: ICD-10-CM

## 2024-03-25 DIAGNOSIS — N18.31 STAGE 3A CHRONIC KIDNEY DISEASE (HCC): ICD-10-CM

## 2024-03-25 DIAGNOSIS — M85.80 OSTEOPENIA AFTER MENOPAUSE: ICD-10-CM

## 2024-03-25 DIAGNOSIS — F51.05 INSOMNIA DUE TO OTHER MENTAL DISORDER: ICD-10-CM

## 2024-03-25 DIAGNOSIS — K82.4 GALLBLADDER POLYP: ICD-10-CM

## 2024-03-25 DIAGNOSIS — Z78.0 OSTEOPENIA AFTER MENOPAUSE: ICD-10-CM

## 2024-03-25 DIAGNOSIS — I10 ESSENTIAL HYPERTENSION: ICD-10-CM

## 2024-03-25 DIAGNOSIS — E78.2 MIXED HYPERLIPIDEMIA: ICD-10-CM

## 2024-03-25 DIAGNOSIS — I42.2 HYPERTROPHIC CARDIOMYOPATHY (HCC): ICD-10-CM

## 2024-03-25 PROCEDURE — 99214 OFFICE O/P EST MOD 30 MIN: CPT | Performed by: FAMILY MEDICINE

## 2024-03-25 PROCEDURE — G0438 PPPS, INITIAL VISIT: HCPCS | Performed by: FAMILY MEDICINE

## 2024-03-25 RX ORDER — LORAZEPAM 0.5 MG/1
0.5 TABLET ORAL
Qty: 30 TABLET | Refills: 0 | Status: SHIPPED | OUTPATIENT
Start: 2024-03-25

## 2024-03-25 NOTE — ASSESSMENT & PLAN NOTE
"Reviewed surgical oncology note from 6/2023: \"By US of 10/14/2022 she has multiple GB polyps, the largest 6 mm. This was not confirmed by MRI of 02/09/2023. If she does have gallbladder polyps, they have a chance to develop into cancer.  recommendation is to have an MRI/MRCP of the abdomen in 6 months to a year. This can be done by her PCP or DDA. She should f/u in the office after imaging.\"    At this time, patient remains asymptomatic. Will plan for MRI/MRCP in Spring 2024. Continue to monitor for symptom development.   "

## 2024-03-25 NOTE — ASSESSMENT & PLAN NOTE
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 This is a cystic tumor in the pancreas which has a chance to develop into cancer, a 50 - 70% chance, if left alone.\"     Per their plan: \"MRI/MRCP of the abdomen in 6 months to a year. This can be done by her PCP or DDA. She should f/u in the office after imaging.\"     At this time, patient remains asymptomatic. Will plan for MRI/MRCP in Spring 2024 based on the above recommendations.   "

## 2024-03-25 NOTE — ASSESSMENT & PLAN NOTE
"Chronic, follows with cardiology at [facility]  Echocardiogram 6/14/2022: \"moderate concentric left ventricular hypertrophy with ejection fraction of 80% with indeterminate diastolic parameters, minimal trace aortic regurgitation, trace to mild mitral regurgitation, trace tricuspid regurgitation with normal pulmonary pressure estimate. Overall, no significant change.\"     Plan: continue toprol XL 100mg daily, verapamil 180mg daily  Continue to monitor BMP, Lipid q6-12mo  F/u 6 mo  "

## 2024-03-25 NOTE — PATIENT INSTRUCTIONS

## 2024-03-25 NOTE — ASSESSMENT & PLAN NOTE
Lab Results   Component Value Date    CHOLESTEROL 191 09/12/2023    TRIG 98 09/12/2023    HDL 45 (L) 09/12/2023    LDLCALC 126 (H) 09/12/2023     Overall, has been lifestyle controlled  Recommend continue with healthy lifestyle habits  Monitor lipid panel q 6-12 mo

## 2024-03-25 NOTE — ASSESSMENT & PLAN NOTE
"Reviewed Surgical Oncology office visit 6/2023:  \"had this liver mass since 1994. 2014 it was 7 cm, now at 11 cm. She also has a smaller hemangioma 5 cm which is not exophytic. These are benign and if they are not causing her symptoms we can leave them alone for now.\"    Patient remains asymptomatic   Monitor CMP q6-12 mo  Continue to monitor for development of symptoms  "

## 2024-03-25 NOTE — PROGRESS NOTES
" Assessment and Plan:     Problem List Items Addressed This Visit       Hepatic cyst     Reviewed Surgical Oncology office visit 6/2023:  \"had this liver mass since 1994. 2014 it was 7 cm, now at 11 cm. She also has a smaller hemangioma 5 cm which is not exophytic. 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 This is a cystic tumor in the pancreas which has a chance to develop into cancer, a 50 - 70% chance, if left alone.\"     Per their plan: \"MRI/MRCP of the abdomen in 6 months to a year. This can be done by her PCP or DDA. She should f/u in the office after imaging.\"     At this time, patient remains asymptomatic. Will plan for MRI/MRCP in Spring 2024 based on the above recommendations.          Relevant Orders    MRI abdomen w wo contrast and mrcp    Gallbladder polyp     Reviewed surgical oncology note from 6/2023: \"By US of 10/14/2022 she has multiple GB polyps, the largest 6 mm. This was not confirmed by MRI of 02/09/2023. If she does have gallbladder polyps, they have a chance to develop into cancer.  recommendation is to have an MRI/MRCP of the abdomen in 6 months to a year. This can be done by her PCP or DDA. She should f/u in the office after imaging.\"    At this time, patient remains asymptomatic. Will plan for MRI/MRCP in Spring 2024. Continue to monitor for symptom development.          Relevant Orders    MRI abdomen w wo contrast and mrcp    Mixed hyperlipidemia     Lab Results   Component Value Date    CHOLESTEROL 191 09/12/2023    TRIG 98 09/12/2023    HDL 45 (L) 09/12/2023    LDLCALC 126 (H) 09/12/2023     Overall, has been lifestyle controlled  Recommend " "continue with healthy lifestyle habits  Monitor lipid panel q 6-12 mo         Relevant Orders    Lipid panel    Essential hypertension     Chronic, stable  Current    Follows with Cardiology at [facility]   Current meds: HCTZ 25mg daily, toprol XL 100mg daily, ramipril 10mg daily, verapamil 180mg daily    Plan:  Continue current medications  Monitor BMP q6mo  F/u 6mo         Relevant Orders    Comprehensive metabolic panel    Osteopenia after menopause     Dexa 2020: 1. Normal central bone density.   2. Density of the lumbar spine increased by 0.2% from the prior exam which is not significant.   3. Density of the left hip increased by 6.1% from the prior exam which is significant.    Continue vit D and Ca supplementation  Continue healthy lifestyle habits  Monitor dexa q2 years, will plan for repeat dexa ordered at follow up [facility] visit in march 2024.          Relevant Orders    DXA bone density spine hip and pelvis    Vitamin D deficiency     History of low vitamin D >5 years ago  Recent labs have shown improvement with levels in 40's   Continue vitamin D supplementation daily 1000u  Mgmt of osteopenia as above  Monitor Vit D labs q12 months.          Hypertrophic cardiomyopathy (HCC)     Chronic, follows with cardiology at [facility]  Echocardiogram 6/14/2022: \"moderate concentric left ventricular hypertrophy with ejection fraction of 80% with indeterminate diastolic parameters, minimal trace aortic regurgitation, trace to mild mitral regurgitation, trace tricuspid regurgitation with normal pulmonary pressure estimate. Overall, no significant change.\"     Plan: continue toprol XL 100mg daily, verapamil 180mg daily  Continue to monitor BMP, Lipid q6-12mo  F/u 6 mo         Stage 3a chronic kidney disease (HCC)     Chronic, stable  Continue mgmt HTN as above  Continue to monitor with labs q 6mo          Relevant Orders    Comprehensive metabolic panel    Chronic constipation     Chronic, stable  Continue daily " fiber supplementation  Continue to follow with GI as scheduled  Continue to monitor for acute worsening of symptoms          Other Visit Diagnoses       Medicare annual wellness visit, subsequent    -  Primary    Postmenopausal        Relevant Orders    DXA bone density spine hip and pelvis    Polyarthralgia        Relevant Orders    RF Screen w/ Reflex to Titer    NATHALIE Screen w/ Reflex to Titer/Pattern    Insomnia due to other mental disorder        Relevant Medications    LORazepam (ATIVAN) 0.5 mg tablet    Anxiety        Relevant Medications    LORazepam (ATIVAN) 0.5 mg tablet               Return in about 4 months (around 7/25/2024) for follow up chronic conditions.    Preventive health issues were discussed with patient, and age appropriate screening tests were ordered as noted in patient's After Visit Summary.  Personalized health advice and appropriate referrals for health education or preventive services given if needed, as noted in patient's After Visit Summary.     History of Present Illness:     Patient presents for a Medicare Wellness Visit    HPI   Patient Care Team:  Sri Holland DO as PCP - General (Family Medicine)     Review of Systems:     Review of Systems   Constitutional:  Negative for appetite change, chills, diaphoresis, fever and unexpected weight change.   Eyes:  Negative for visual disturbance.   Respiratory:  Negative for shortness of breath.    Cardiovascular:  Negative for chest pain and leg swelling.   Gastrointestinal:  Negative for constipation and diarrhea.   Endocrine: Negative for polydipsia and polyuria.   Genitourinary:  Negative for frequency.   Musculoskeletal:  Positive for arthralgias.   Neurological:  Negative for dizziness, light-headedness and headaches.        Problem List:     Patient Active Problem List   Diagnosis    Hepatic cyst    Pancreatic cyst    Gallbladder polyp    Mixed hyperlipidemia    Essential hypertension    Osteopenia after menopause    Vitamin D  deficiency    Hypertrophic cardiomyopathy (HCC)    Stage 3a chronic kidney disease (HCC)    Vertigo    Chronic constipation    Pain of left hip    Trochanteric bursitis of left hip      Past Medical and Surgical History:     Past Medical History:   Diagnosis Date    Hypertension     Vertigo      Past Surgical History:   Procedure Laterality Date    APPENDECTOMY      HYSTERECTOMY      JOINT REPLACEMENT        Family History:     History reviewed. No pertinent family history.   Social History:     Social History     Socioeconomic History    Marital status: /Civil Union     Spouse name: None    Number of children: None    Years of education: None    Highest education level: None   Occupational History    None   Tobacco Use    Smoking status: Never    Smokeless tobacco: Never   Vaping Use    Vaping status: Never Used   Substance and Sexual Activity    Alcohol use: Yes     Comment: socially    Drug use: Not Currently    Sexual activity: None   Other Topics Concern    None   Social History Narrative    None     Social Determinants of Health     Financial Resource Strain: Not on file   Food Insecurity: Not on file   Transportation Needs: Not on file   Physical Activity: Not on file   Stress: Not on file   Social Connections: Not on file   Intimate Partner Violence: Not on file   Housing Stability: Not on file      Medications and Allergies:     Current Outpatient Medications   Medication Sig Dispense Refill    Ascorbic Acid (DUARTE-C PO) Take by mouth      celecoxib (CeleBREX) 200 mg capsule Take 1 capsule (200 mg total) by mouth daily 90 capsule 1    cholecalciferol (VITAMIN D3) 1,000 units tablet Take 5,000 Units by mouth daily      hydrochlorothiazide (HYDRODIURIL) 25 mg tablet Take 1 tablet (25 mg total) by mouth daily 90 tablet 3    LORazepam (ATIVAN) 0.5 mg tablet Take 1 tablet (0.5 mg total) by mouth daily at bedtime as needed for anxiety 30 tablet 0    metoprolol succinate (TOPROL-XL) 100 mg 24 hr tablet Take  1 tablet (100 mg total) by mouth daily 90 tablet 3    ramipril (ALTACE) 10 MG capsule Take 1 capsule (10 mg total) by mouth daily 90 capsule 3    verapamil (VERELAN PM) 180 MG 24 hr capsule Take 1 capsule (180 mg total) by mouth daily 90 capsule 3     No current facility-administered medications for this visit.     Allergies   Allergen Reactions    Iodinated Contrast Media Other (See Comments) and Hives    Yellow Dye - Food Allergy Other (See Comments)      Immunizations:     Immunization History   Administered Date(s) Administered    DTaP 12/12/2011    Pneumococcal Conjugate 13-Valent 10/12/2015    Pneumococcal Polysaccharide PPV23 02/21/2017      Health Maintenance:     There are no preventive care reminders to display for this patient.      Topic Date Due    Influenza Vaccine (1) Never done    COVID-19 Vaccine (1 - 2023-24 season) Never done      Medicare Screening Tests and Risk Assessments:     Amanda is here for her Subsequent Wellness visit.     Health Risk Assessment:   Patient rates overall health as good. Patient feels that their physical health rating is same. Patient is satisfied with their life. Eyesight was rated as same. Hearing was rated as same. Patient feels that their emotional and mental health rating is same. Patients states they are never, rarely angry. Patient states they are never, rarely unusually tired/fatigued. Pain experienced in the last 7 days has been some. Patient's pain rating has been 4/10. Patient states that she has experienced no weight loss or gain in last 6 months.     Fall Risk Screening:   In the past year, patient has experienced: no history of falling in past year      Urinary Incontinence Screening:   Patient has not leaked urine accidently in the last six months.     Home Safety:  Patient does not have trouble with stairs inside or outside of their home. Patient has working smoke alarms and has no working carbon monoxide detector. Home safety hazards include: none.      Nutrition:   Current diet is Regular.     Medications:   Patient is currently taking over-the-counter supplements. OTC medications include: see medication list. Patient is able to manage medications.     Activities of Daily Living (ADLs)/Instrumental Activities of Daily Living (IADLs):   Walk and transfer into and out of bed and chair?: Yes  Dress and groom yourself?: Yes    Bathe or shower yourself?: Yes    Feed yourself? Yes  Do your laundry/housekeeping?: Yes  Manage your money, pay your bills and track your expenses?: Yes  Make your own meals?: Yes    Do your own shopping?: Yes    Previous Hospitalizations:   Any hospitalizations or ED visits within the last 12 months?: No      Advance Care Planning:   Living will: Yes    Durable POA for healthcare: Yes    Advanced directive: Yes      Cognitive Screening:   Provider or family/friend/caregiver concerned regarding cognition?: No    PREVENTIVE SCREENINGS      Cardiovascular Screening:    General: Screening Not Indicated and History Lipid Disorder      Diabetes Screening:     General: Screening Current      Colorectal Cancer Screening:     General: Screening Current      Breast Cancer Screening:     General: Screening Not Indicated      Cervical Cancer Screening:    General: Screening Not Indicated      Osteoporosis Screening:      Due for: Bone Density Ultrasound      Abdominal Aortic Aneurysm (AAA) Screening:        General: Screening Not Indicated      Lung Cancer Screening:     General: Screening Not Indicated      Hepatitis C Screening:    General: Screening Current    Screening, Brief Intervention, and Referral to Treatment (SBIRT)    Screening      Single Item Drug Screening:  How often have you used an illegal drug (including marijuana) or a prescription medication for non-medical reasons in the past year? never    Single Item Drug Screen Score: 0  Interpretation: Negative screen for possible drug use disorder    Other Counseling Topics:   Calcium and  vitamin D intake and regular weightbearing exercise.     No results found.     Physical Exam:     /93 (BP Location: Right arm, Patient Position: Sitting, Cuff Size: Standard)   Wt 80 kg (176 lb 5.9 oz)   SpO2 96%   BMI 30.27 kg/m²     Physical Exam  Vitals reviewed.   Constitutional:       General: She is not in acute distress.     Appearance: She is well-developed and normal weight. She is not ill-appearing.   HENT:      Head: Normocephalic and atraumatic.      Right Ear: External ear normal.      Left Ear: External ear normal.      Nose: Nose normal.   Eyes:      Extraocular Movements: Extraocular movements intact.      Conjunctiva/sclera: Conjunctivae normal.   Neck:      Vascular: No carotid bruit or JVD.   Cardiovascular:      Rate and Rhythm: Normal rate and regular rhythm.      Heart sounds: Normal heart sounds. No murmur heard.  Pulmonary:      Effort: Pulmonary effort is normal.      Breath sounds: Normal breath sounds.   Abdominal:      General: Abdomen is flat.      Palpations: Abdomen is soft.   Musculoskeletal:      Cervical back: Neck supple.      Right lower leg: No edema.      Left lower leg: No edema.   Neurological:      General: No focal deficit present.      Mental Status: She is alert.   Psychiatric:         Mood and Affect: Mood normal.         Behavior: Behavior normal.          Sri Holland DO

## 2024-03-25 NOTE — ASSESSMENT & PLAN NOTE
Chronic, stable  Current    Follows with Cardiology at [facility]   Current meds: HCTZ 25mg daily, toprol XL 100mg daily, ramipril 10mg daily, verapamil 180mg daily    Plan:  Continue current medications  Monitor BMP q6mo  F/u 6mo

## 2024-04-09 ENCOUNTER — HOSPITAL ENCOUNTER (OUTPATIENT)
Dept: RADIOLOGY | Facility: CLINIC | Age: 79
Discharge: HOME | End: 2024-04-09
Payer: MEDICARE

## 2024-04-09 VITALS — HEIGHT: 64 IN | BODY MASS INDEX: 29.57 KG/M2 | WEIGHT: 173.2 LBS

## 2024-04-09 DIAGNOSIS — Z78.0 POSTMENOPAUSAL: ICD-10-CM

## 2024-04-09 DIAGNOSIS — M85.80 OSTEOPENIA AFTER MENOPAUSE: ICD-10-CM

## 2024-04-09 DIAGNOSIS — Z78.0 OSTEOPENIA AFTER MENOPAUSE: ICD-10-CM

## 2024-04-09 PROCEDURE — 77080 DXA BONE DENSITY AXIAL: CPT

## 2024-04-15 ENCOUNTER — TELEPHONE (OUTPATIENT)
Dept: FAMILY MEDICINE CLINIC | Facility: CLINIC | Age: 79
End: 2024-04-15

## 2024-04-15 NOTE — TELEPHONE ENCOUNTER
"Spoke with Brian Lawrence's  and related the message per Skyler \"Dexa screening is positive for osteopenia.     A daily intake of at least 1200mg calcium and 1000 units of Vitamin D, as well as weight bearing and muscle strengthening exercise, fall prevention and avoidance of tobacco and excessive alcohol intake as basic preventive measures are suggested.     Plan for repeat Dexa in 2 years. Please call with any questions or concerns. Brian stated that he will have Melinda give us a call to relate the message to her so there wont be any type of confusion. Provided our phone number and disconnected the call.   "

## 2024-04-15 NOTE — TELEPHONE ENCOUNTER
----- Message from Sri Holland DO sent at 4/15/2024  2:14 PM EDT -----  Dexa screening is positive for osteopenia.     A daily intake of at least 1200mg calcium and 1000 units of Vitamin D, as well as weight bearing and muscle strengthening exercise, fall prevention and avoidance of tobacco and excessive alcohol intake as basic preventive measures are suggested.     Plan for repeat Dexa in 2 years. Please call with any questions or concerns.

## 2024-04-16 NOTE — TELEPHONE ENCOUNTER
Received call from patient to review DEXA scan results.  Relayed Dr. Holland's message regarding results.  Patient states that she already takes Vit D daily and will add in Calcium.    No further questions offered.

## 2024-09-08 DIAGNOSIS — M70.62 TROCHANTERIC BURSITIS OF LEFT HIP: ICD-10-CM

## 2024-09-09 RX ORDER — CELECOXIB 200 MG/1
200 CAPSULE ORAL DAILY
Qty: 90 CAPSULE | Refills: 1 | Status: SHIPPED | OUTPATIENT
Start: 2024-09-09

## 2024-09-11 DIAGNOSIS — I10 ESSENTIAL HYPERTENSION: ICD-10-CM

## 2024-09-11 RX ORDER — RAMIPRIL 10 MG/1
10 CAPSULE ORAL DAILY
Qty: 30 CAPSULE | Refills: 0 | Status: SHIPPED | OUTPATIENT
Start: 2024-09-11

## 2024-09-18 ENCOUNTER — RA CDI HCC (OUTPATIENT)
Dept: OTHER | Facility: HOSPITAL | Age: 79
End: 2024-09-18

## 2024-09-18 PROBLEM — N18.30 HYPERTENSIVE KIDNEY DISEASE WITH CHRONIC KIDNEY DISEASE STAGE III (HCC): Status: ACTIVE | Noted: 2024-09-18

## 2024-09-18 PROBLEM — I12.9 HYPERTENSIVE KIDNEY DISEASE WITH CHRONIC KIDNEY DISEASE STAGE III (HCC): Status: ACTIVE | Noted: 2024-09-18

## 2024-09-24 PROBLEM — H91.93 BILATERAL HEARING LOSS: Status: ACTIVE | Noted: 2022-03-01

## 2024-09-24 RX ORDER — CALCIUM CARBONATE/VITAMIN D3 500MG-5MCG
TABLET ORAL
COMMUNITY
Start: 2024-08-27

## 2024-09-24 RX ORDER — MULTIVIT WITH MINERALS/LUTEIN
500 TABLET ORAL DAILY
COMMUNITY

## 2024-09-24 NOTE — ASSESSMENT & PLAN NOTE
History of low vitamin D >5 years ago  Recent labs have shown improvement with levels in 40's   Continue vitamin D supplementation daily 1000u  Mgmt of osteopenia as above  Monitor Vit D labs q12 months.     Orders:    Vitamin D 25 hydroxy; Future

## 2024-09-24 NOTE — ASSESSMENT & PLAN NOTE
Melinda is asymptomatic with respect to the hypertrophy. Of importance, there is no outflow tract gradient and no dynamic change with Valsalva. Will continue to monitor. Follows with cardiology for monitoring Echos.

## 2024-09-24 NOTE — ASSESSMENT & PLAN NOTE
Dexa 4/9/2024 - osteopenia    Continue vit D and Ca supplementation  Continue healthy lifestyle habits  Monitor dexa q2 years, will plan for repeat dexa 4/2026

## 2024-09-24 NOTE — ASSESSMENT & PLAN NOTE
"Reviewed surgical oncology note from 6/2023: \"By US of 10/14/2022 she has multiple GB polyps, the largest 6 mm. This was not confirmed by MRI of 02/09/2023. If she does have gallbladder polyps, they have a chance to develop into cancer.  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 Gallbladder polyps warranting right upper quadrant sonogram on a nonemergent, outpatient basis.  US completed: There are a few gallbladder polyps, ranging in size up to 6 mm.   Gen Surg note from 9/22/24 reviewed: recommendation is for laparoscopic cholecystectomy due to the polyps being multiple, patient would like to continue observation which is reasonable as they have stayed the same size, will refer back to GI DDA for continued surveilance     At this time, patient remains asymptomatic. Continue to monitor for symptom development.          "

## 2024-09-24 NOTE — ASSESSMENT & PLAN NOTE
"Chronic, follows with cardiology at [facility]  Echocardiogram 6/14/2022: \"moderate concentric left ventricular hypertrophy with ejection fraction of 80% with indeterminate diastolic parameters, minimal trace aortic regurgitation, trace to mild mitral regurgitation, trace tricuspid regurgitation with normal pulmonary pressure estimate. Overall, no significant change.\"   Will need to retrieve results of Echo from 4/24/24    Plan: continue toprol XL 100mg daily, verapamil 240mg daily  Continue to monitor BMP, Lipid q6-12mo  F/u 6 mo    Orders:    Lipid panel; Future    "

## 2024-09-24 NOTE — ASSESSMENT & PLAN NOTE
Chronic, stable  Current Blood Pressure: 162/88  Follows with Cardiology at [facility]   Current meds: HCTZ 25mg daily, toprol XL 100mg daily, ramipril 10mg daily, verapamil 180mg daily      Plan:  Increase Verapamil to 240mg daily  Monitor BMP q6mo  F/u 6mo    Orders:    verapamil (Verelan) 240 MG 24 hr capsule; Take 1 capsule (240 mg total) by mouth daily

## 2024-09-24 NOTE — ASSESSMENT & PLAN NOTE
Reviewed Surgical Oncology office visit 9/22/24:  Benigng liver cyst that is asymptomatic, continue to monitor for signs of symptoms which would indicate need for resectoin; Liver hemangioma, Asymptomatic, it is not exophytic, and less than 10 cm's. Recommend to continue to monitor for symptoms and imaging for this on as needed basis     Patient remains asymptomatic   Monitor CMP q6-12 mo, Monitor RUQ US annually, repeat 8/2025  Continue to monitor for development of symptoms

## 2024-09-24 NOTE — ASSESSMENT & PLAN NOTE
Lab Results   Component Value Date    CHOLESTEROL 191 09/12/2023    TRIG 98 09/12/2023    HDL 45 (L) 09/12/2023    LDLCALC 126 (H) 09/12/2023     Overall, has been lifestyle controlled  Recommend continue with healthy lifestyle habits  Monitor lipid panel q 6-12 mo - labs previously ordered and not completed - recommend to get these done    Orders:    Lipid panel; Future

## 2024-09-24 NOTE — PROGRESS NOTES
"Ambulatory Visit  Name: Amanda Troncoso      : 1945      MRN: [id number]  Encounter Provider: Sri Holland DO  Encounter Date: 2024   Encounter department: [facility]    Assessment & Plan  Essential hypertension  Chronic, stable  Current Blood Pressure: 162/88  Follows with Cardiology at [facility]   Current meds: HCTZ 25mg daily, toprol XL 100mg daily, ramipril 10mg daily, verapamil 180mg daily      Plan:  Increase Verapamil to 240mg daily  Monitor BMP q6mo  F/u 6mo    Orders:    verapamil (Verelan) 240 MG 24 hr capsule; Take 1 capsule (240 mg total) by mouth daily    Hypertrophic cardiomyopathy (HCC)  Chronic, follows with cardiology at [facility], [facility]  Echocardiogram 2022: \"moderate concentric left ventricular hypertrophy with ejection fraction of 80% with indeterminate diastolic parameters, minimal trace aortic regurgitation, trace to mild mitral regurgitation, trace tricuspid regurgitation with normal pulmonary pressure estimate. Overall, no significant change.\"   Will need to retrieve results of Echo from 24    Plan: continue toprol XL 100mg daily, verapamil 240mg daily  Continue to monitor BMP, Lipid q6-12mo  F/u 6 mo    Orders:    Lipid panel; Future    Chronic constipation  Chronic, stable  Continue daily fiber supplementation  Continue to follow with GI as scheduled  Continue to monitor for acute worsening of symptoms         Gallbladder polyp  Reviewed surgical oncology note from 2023: \"By US of 10/14/2022 she has multiple GB polyps, the largest 6 mm. This was not confirmed by MRI of 2023. If she does have gallbladder polyps, they have a chance to develop into cancer.  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 Gallbladder polyps warranting right upper quadrant sonogram on a nonemergent, outpatient basis.  US completed: There are a few gallbladder polyps, " "ranging in size up to 6 mm.   Gen Surg note from 9/22/24 reviewed: recommendation is for laparoscopic cholecystectomy due to the polyps being multiple, patient would like to continue observation which is reasonable as they have stayed the same size, will refer back to GI DDA for continued surveilance     At this time, patient remains asymptomatic. Continue to monitor for symptom development.          Hepatic cyst  Reviewed Surgical Oncology office visit 9/22/24:  Benigng liver cyst that is asymptomatic, continue to monitor for signs of symptoms which would indicate need for resectoin; Liver hemangioma, Asymptomatic, it is not exophytic, and less than 10 cm's. Recommend to continue to monitor for symptoms and imaging for this on as needed basis     Patient remains asymptomatic   Monitor CMP q6-12 mo, Monitor RUQ US annually, repeat 8/2025  Continue to monitor for development of symptoms         Pancreatic cyst  Reviwed office visit with surgical oncology 9/22/2024: \"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 Will plan for MRI/MRCP in Spring 2025 based on the above recommendations.          Osteopenia after menopause  Dexa 4/9/2024 - osteopenia    Continue vit D and Ca supplementation  Continue healthy lifestyle habits  Monitor dexa q2 years, will plan for repeat dexa 4/2026         Stage 3a chronic kidney disease (HCC)  Lab Results   Component Value Date    EGFR 50 09/12/2023    EGFR 45.98 03/03/2023    EGFR 45.06 08/25/2022    CREATININE 1.04 09/12/2023    CREATININE 1.14 03/03/2023    CREATININE 1.16 08/25/2022     Chronic, stable  Continue mgmt HTN as above  Continue to monitor with labs q 6mo - labs previously ordered and not completed    Orders:    Comprehensive metabolic panel; Future    Mixed " hyperlipidemia  Lab Results   Component Value Date    CHOLESTEROL 191 09/12/2023    TRIG 98 09/12/2023    HDL 45 (L) 09/12/2023    LDLCALC 126 (H) 09/12/2023     Overall, has been lifestyle controlled  Recommend continue with healthy lifestyle habits  Monitor lipid panel q 6-12 mo - labs previously ordered and not completed - recommend to get these done    Orders:    Lipid panel; Future    Vitamin D deficiency  History of low vitamin D >5 years ago  Recent labs have shown improvement with levels in 40's   Continue vitamin D supplementation daily 1000u  Mgmt of osteopenia as above  Monitor Vit D labs q12 months.     Orders:    Vitamin D 25 hydroxy; Future    Left ventricular hypertrophy  Melinda is asymptomatic with respect to the hypertrophy. Of importance, there is no outflow tract gradient and no dynamic change with Valsalva. Will continue to monitor. Follows with cardiology for monitoring Echos.          Bilateral hearing loss, unspecified hearing loss type  Has hearing aids b/l. Followed by ENT         Trochanteric bursitis of left hip    Orders:    celecoxib (CeleBREX) 200 mg capsule; Take 1 capsule (200 mg total) by mouth 2 (two) times a day    Insomnia due to other mental disorder    Orders:    LORazepam (ATIVAN) 0.5 mg tablet; Take 1 tablet (0.5 mg total) by mouth daily at bedtime as needed for anxiety    Anxiety    Orders:    LORazepam (ATIVAN) 0.5 mg tablet; Take 1 tablet (0.5 mg total) by mouth daily at bedtime as needed for anxiety      Depression Screening and Follow-up Plan: Patient was screened for depression during today's encounter. They screened negative with a PHQ-2 score of 0.      Return in about 26 weeks (around 3/26/2025) for AWV.      History of Present Illness     Chief Complaint   Patient presents with    Follow-up       HPI  Patient presents for follow up. She reports no acute concerns today. She continues with chronic arthralgia pains and recently increased Celebrex to BID per her podiatrist  for her foot and ankle pain.     History obtained from : patient  Review of Systems   Constitutional:  Negative for appetite change, chills, diaphoresis, fever and unexpected weight change.   Eyes:  Negative for visual disturbance.   Respiratory:  Negative for shortness of breath.    Cardiovascular:  Negative for chest pain and leg swelling.   Gastrointestinal:  Negative for constipation and diarrhea.   Endocrine: Negative for polydipsia and polyuria.   Genitourinary:  Negative for frequency.   Musculoskeletal:  Positive for arthralgias.   Neurological:  Negative for dizziness, light-headedness and headaches.     Current Outpatient Medications on File Prior to Visit   Medication Sig Dispense Refill    Ascorbic Acid (DUARTE-C PO) Take by mouth      Ascorbic Acid (vitamin C) 1000 MG tablet Take 500 mg by mouth daily      Calcium Carb-Cholecalciferol (Calcium + Vitamin D3) 500-5 MG-MCG TABS Take by mouth      cholecalciferol (VITAMIN D3) 1,000 units tablet Take 5,000 Units by mouth daily      hydrochlorothiazide (HYDRODIURIL) 25 mg tablet Take 1 tablet (25 mg total) by mouth daily 90 tablet 3    metoprolol succinate (TOPROL-XL) 100 mg 24 hr tablet Take 1 tablet (100 mg total) by mouth daily 90 tablet 3    ramipril (ALTACE) 10 MG capsule TAKE 1 CAPSULE BY MOUTH EVERY DAY 30 capsule 0    [DISCONTINUED] celecoxib (CeleBREX) 200 mg capsule TAKE 1 CAPSULE BY MOUTH EVERY DAY 90 capsule 1    [DISCONTINUED] LORazepam (ATIVAN) 0.5 mg tablet Take 1 tablet (0.5 mg total) by mouth daily at bedtime as needed for anxiety 30 tablet 0    [DISCONTINUED] verapamil (VERELAN PM) 180 MG 24 hr capsule Take 1 capsule (180 mg total) by mouth daily 90 capsule 3     No current facility-administered medications on file prior to visit.      Social History     Tobacco Use    Smoking status: Never    Smokeless tobacco: Never   Vaping Use    Vaping status: Never Used   Substance and Sexual Activity    Alcohol use: Yes     Comment: socially    Drug  use: Not Currently    Sexual activity: Not on file         Objective     /88 (BP Location: Right arm, Patient Position: Sitting, Cuff Size: Standard)   Pulse 60   Temp (!) 97.4 °F (36.3 °C) (Tympanic)   Wt 79.8 kg (175 lb 14.8 oz)   SpO2 98%   BMI 30.68 kg/m²     Physical Exam  Vitals reviewed.   Constitutional:       General: She is not in acute distress.     Appearance: She is well-developed and normal weight. She is not ill-appearing.   HENT:      Head: Normocephalic and atraumatic.      Right Ear: External ear normal.      Left Ear: External ear normal.      Nose: Nose normal.   Eyes:      Extraocular Movements: Extraocular movements intact.      Conjunctiva/sclera: Conjunctivae normal.   Neck:      Vascular: No carotid bruit or JVD.   Cardiovascular:      Rate and Rhythm: Normal rate and regular rhythm.      Heart sounds: Normal heart sounds. No murmur heard.  Pulmonary:      Effort: Pulmonary effort is normal.      Breath sounds: Normal breath sounds.   Abdominal:      General: Abdomen is flat.      Palpations: Abdomen is soft.   Musculoskeletal:      Cervical back: Neck supple.      Right lower leg: No edema.      Left lower leg: No edema.   Neurological:      General: No focal deficit present.      Mental Status: She is alert.   Psychiatric:         Mood and Affect: Mood normal.         Behavior: Behavior normal.

## 2024-09-24 NOTE — ASSESSMENT & PLAN NOTE
Lab Results   Component Value Date    EGFR 50 09/12/2023    EGFR 45.98 03/03/2023    EGFR 45.06 08/25/2022    CREATININE 1.04 09/12/2023    CREATININE 1.14 03/03/2023    CREATININE 1.16 08/25/2022     Chronic, stable  Continue mgmt HTN as above  Continue to monitor with labs q 6mo - labs previously ordered and not completed    Orders:    Comprehensive metabolic panel; Future

## 2024-09-25 ENCOUNTER — OFFICE VISIT (OUTPATIENT)
Dept: FAMILY MEDICINE CLINIC | Facility: CLINIC | Age: 79
End: 2024-09-25
Payer: MEDICARE

## 2024-09-25 VITALS
BODY MASS INDEX: 30.68 KG/M2 | DIASTOLIC BLOOD PRESSURE: 88 MMHG | WEIGHT: 175.93 LBS | HEART RATE: 60 BPM | OXYGEN SATURATION: 98 % | SYSTOLIC BLOOD PRESSURE: 162 MMHG | TEMPERATURE: 97.4 F

## 2024-09-25 DIAGNOSIS — I51.7 LEFT VENTRICULAR HYPERTROPHY: ICD-10-CM

## 2024-09-25 DIAGNOSIS — F41.9 ANXIETY: ICD-10-CM

## 2024-09-25 DIAGNOSIS — F51.05 INSOMNIA DUE TO OTHER MENTAL DISORDER: ICD-10-CM

## 2024-09-25 DIAGNOSIS — K82.4 GALLBLADDER POLYP: ICD-10-CM

## 2024-09-25 DIAGNOSIS — H91.93 BILATERAL HEARING LOSS, UNSPECIFIED HEARING LOSS TYPE: ICD-10-CM

## 2024-09-25 DIAGNOSIS — K86.2 PANCREATIC CYST: ICD-10-CM

## 2024-09-25 DIAGNOSIS — I10 ESSENTIAL HYPERTENSION: Primary | ICD-10-CM

## 2024-09-25 DIAGNOSIS — K59.09 CHRONIC CONSTIPATION: ICD-10-CM

## 2024-09-25 DIAGNOSIS — M85.80 OSTEOPENIA AFTER MENOPAUSE: ICD-10-CM

## 2024-09-25 DIAGNOSIS — I42.2 HYPERTROPHIC CARDIOMYOPATHY (HCC): ICD-10-CM

## 2024-09-25 DIAGNOSIS — N18.31 STAGE 3A CHRONIC KIDNEY DISEASE (HCC): ICD-10-CM

## 2024-09-25 DIAGNOSIS — E55.9 VITAMIN D DEFICIENCY: ICD-10-CM

## 2024-09-25 DIAGNOSIS — M70.62 TROCHANTERIC BURSITIS OF LEFT HIP: ICD-10-CM

## 2024-09-25 DIAGNOSIS — E78.2 MIXED HYPERLIPIDEMIA: ICD-10-CM

## 2024-09-25 DIAGNOSIS — K76.89 HEPATIC CYST: ICD-10-CM

## 2024-09-25 DIAGNOSIS — Z78.0 OSTEOPENIA AFTER MENOPAUSE: ICD-10-CM

## 2024-09-25 DIAGNOSIS — F99 INSOMNIA DUE TO OTHER MENTAL DISORDER: ICD-10-CM

## 2024-09-25 PROCEDURE — 99214 OFFICE O/P EST MOD 30 MIN: CPT | Performed by: FAMILY MEDICINE

## 2024-09-25 PROCEDURE — G2211 COMPLEX E/M VISIT ADD ON: HCPCS | Performed by: FAMILY MEDICINE

## 2024-09-25 RX ORDER — LORAZEPAM 0.5 MG/1
0.5 TABLET ORAL
Qty: 30 TABLET | Refills: 0 | Status: SHIPPED | OUTPATIENT
Start: 2024-09-25

## 2024-09-25 RX ORDER — VERAPAMIL HYDROCHLORIDE 240 MG/1
240 CAPSULE, EXTENDED RELEASE ORAL DAILY
Qty: 90 CAPSULE | Refills: 1 | Status: SHIPPED | OUTPATIENT
Start: 2024-09-25

## 2024-09-25 RX ORDER — CELECOXIB 200 MG/1
200 CAPSULE ORAL 2 TIMES DAILY
Qty: 180 CAPSULE | Refills: 1 | Status: SHIPPED | OUTPATIENT
Start: 2024-09-25

## 2024-09-25 NOTE — ASSESSMENT & PLAN NOTE
Orders:    celecoxib (CeleBREX) 200 mg capsule; Take 1 capsule (200 mg total) by mouth 2 (two) times a day

## 2024-09-26 ENCOUNTER — APPOINTMENT (OUTPATIENT)
Dept: LAB | Facility: CLINIC | Age: 79
End: 2024-09-26
Payer: MEDICARE

## 2024-09-26 DIAGNOSIS — E55.9 VITAMIN D DEFICIENCY: ICD-10-CM

## 2024-09-26 DIAGNOSIS — I42.2 HYPERTROPHIC CARDIOMYOPATHY (HCC): ICD-10-CM

## 2024-09-26 DIAGNOSIS — M25.50 POLYARTHRALGIA: ICD-10-CM

## 2024-09-26 DIAGNOSIS — N18.31 STAGE 3A CHRONIC KIDNEY DISEASE (HCC): ICD-10-CM

## 2024-09-26 DIAGNOSIS — E78.2 MIXED HYPERLIPIDEMIA: ICD-10-CM

## 2024-09-26 LAB
25(OH)D3 SERPL-MCNC: 43.8 NG/ML (ref 30–100)
ALBUMIN SERPL BCG-MCNC: 4.2 G/DL (ref 3.5–5)
ALP SERPL-CCNC: 68 U/L (ref 34–104)
ALT SERPL W P-5'-P-CCNC: 17 U/L (ref 7–52)
ANA SER QL IA: NEGATIVE
ANION GAP SERPL CALCULATED.3IONS-SCNC: 6 MMOL/L (ref 4–13)
AST SERPL W P-5'-P-CCNC: 16 U/L (ref 13–39)
BILIRUB SERPL-MCNC: 0.67 MG/DL (ref 0.2–1)
BUN SERPL-MCNC: 25 MG/DL (ref 5–25)
CALCIUM SERPL-MCNC: 9.8 MG/DL (ref 8.4–10.2)
CHLORIDE SERPL-SCNC: 103 MMOL/L (ref 96–108)
CHOLEST SERPL-MCNC: 199 MG/DL
CO2 SERPL-SCNC: 32 MMOL/L (ref 21–32)
CREAT SERPL-MCNC: 1.04 MG/DL (ref 0.6–1.3)
GFR SERPL CREATININE-BSD FRML MDRD: 50 ML/MIN/1.73SQ M
GLUCOSE P FAST SERPL-MCNC: 84 MG/DL (ref 65–99)
HDLC SERPL-MCNC: 50 MG/DL
LDLC SERPL CALC-MCNC: 127 MG/DL (ref 0–100)
NONHDLC SERPL-MCNC: 149 MG/DL
POTASSIUM SERPL-SCNC: 4.4 MMOL/L (ref 3.5–5.3)
PROT SERPL-MCNC: 7.3 G/DL (ref 6.4–8.4)
SODIUM SERPL-SCNC: 141 MMOL/L (ref 135–147)
TRIGL SERPL-MCNC: 112 MG/DL

## 2024-09-26 PROCEDURE — 86038 ANTINUCLEAR ANTIBODIES: CPT

## 2024-09-26 PROCEDURE — 82306 VITAMIN D 25 HYDROXY: CPT

## 2024-09-26 PROCEDURE — 86430 RHEUMATOID FACTOR TEST QUAL: CPT

## 2024-09-26 PROCEDURE — 80053 COMPREHEN METABOLIC PANEL: CPT

## 2024-09-26 PROCEDURE — 36415 COLL VENOUS BLD VENIPUNCTURE: CPT

## 2024-09-26 PROCEDURE — 80061 LIPID PANEL: CPT

## 2024-09-27 ENCOUNTER — TELEPHONE (OUTPATIENT)
Dept: FAMILY MEDICINE CLINIC | Facility: CLINIC | Age: 79
End: 2024-09-27

## 2024-09-27 LAB — RHEUMATOID FACT SER QL LA: NEGATIVE

## 2024-09-27 NOTE — TELEPHONE ENCOUNTER
----- Message from Sri Holland DO sent at 9/27/2024  9:21 AM EDT -----  Please let patient know - Labs are stable.  No medication or management changes at this time.  Please contact the office if you have any concerns or questions.

## 2024-09-27 NOTE — TELEPHONE ENCOUNTER
"Attempted to contact Melinda to relate the message per Skyler \"Please let patient know - Labs are stable.  No medication or management changes at this time.  Please contact the office if you have any concerns or questions.\" Patient didn't answer/ left a voicemail.   "

## 2024-12-24 DIAGNOSIS — I10 ESSENTIAL HYPERTENSION: ICD-10-CM

## 2024-12-24 RX ORDER — RAMIPRIL 10 MG/1
10 CAPSULE ORAL DAILY
Qty: 90 CAPSULE | Refills: 1 | Status: SHIPPED | OUTPATIENT
Start: 2024-12-24

## 2024-12-25 DIAGNOSIS — I10 ESSENTIAL HYPERTENSION: ICD-10-CM

## 2024-12-26 DIAGNOSIS — I10 ESSENTIAL HYPERTENSION: ICD-10-CM

## 2024-12-26 RX ORDER — VERAPAMIL HYDROCHLORIDE 240 MG/1
240 CAPSULE, EXTENDED RELEASE ORAL DAILY
Qty: 90 CAPSULE | Refills: 1 | Status: SHIPPED | OUTPATIENT
Start: 2024-12-26

## 2024-12-26 RX ORDER — METOPROLOL SUCCINATE 100 MG/1
100 TABLET, EXTENDED RELEASE ORAL DAILY
Qty: 90 TABLET | Refills: 1 | Status: SHIPPED | OUTPATIENT
Start: 2024-12-26

## 2024-12-26 RX ORDER — HYDROCHLOROTHIAZIDE 25 MG/1
25 TABLET ORAL DAILY
Qty: 90 TABLET | Refills: 1 | Status: SHIPPED | OUTPATIENT
Start: 2024-12-26

## 2025-01-02 ENCOUNTER — OFFICE VISIT (OUTPATIENT)
Dept: FAMILY MEDICINE CLINIC | Facility: CLINIC | Age: 80
End: 2025-01-02
Payer: MEDICARE

## 2025-01-02 ENCOUNTER — APPOINTMENT (OUTPATIENT)
Dept: RADIOLOGY | Facility: CLINIC | Age: 80
End: 2025-01-02
Payer: MEDICARE

## 2025-01-02 VITALS — DIASTOLIC BLOOD PRESSURE: 80 MMHG | HEART RATE: 51 BPM | SYSTOLIC BLOOD PRESSURE: 160 MMHG | OXYGEN SATURATION: 95 %

## 2025-01-02 DIAGNOSIS — M54.50 ACUTE MIDLINE LOW BACK PAIN WITHOUT SCIATICA: ICD-10-CM

## 2025-01-02 DIAGNOSIS — K59.09 CHRONIC CONSTIPATION: ICD-10-CM

## 2025-01-02 DIAGNOSIS — K62.9 RECTAL LESION: ICD-10-CM

## 2025-01-02 DIAGNOSIS — M54.50 ACUTE MIDLINE LOW BACK PAIN WITHOUT SCIATICA: Primary | ICD-10-CM

## 2025-01-02 DIAGNOSIS — Z12.11 SCREENING FOR COLON CANCER: ICD-10-CM

## 2025-01-02 PROCEDURE — 99213 OFFICE O/P EST LOW 20 MIN: CPT | Performed by: FAMILY MEDICINE

## 2025-01-02 PROCEDURE — G2211 COMPLEX E/M VISIT ADD ON: HCPCS | Performed by: FAMILY MEDICINE

## 2025-01-02 PROCEDURE — 72080 X-RAY EXAM THORACOLMB 2/> VW: CPT

## 2025-01-02 NOTE — PROGRESS NOTES
Name: Amanda Troncoso      : 1945      MRN: [id number]  Encounter Provider: Sri Lopes DO  Encounter Date: 2025   Encounter department: [facility]  :  Assessment & Plan  Acute midline low back pain without sciatica  Point tenderness around T12-L2 along spinous process  Recommend xrays at this time  Steadily improving with BM this morning - possibly related to constipation - chronic - has been following with GI but admits she has not had a colonoscopy in >10 years  She is concerned as when she is so constipated, she uses digital maneuvers to get stool relief and she feels a palpable lesion internally.   Recommend she see colorectal surgery for this ASAP  Follow up pending xray results and CRS eval  Consider PT as well     Orders:    XR spine thoracolumbar 2 vw; Future    Rectal lesion    Orders:    Ambulatory Referral to Colorectal Surgery; Future    Screening for colon cancer    Orders:    Ambulatory Referral to Colorectal Surgery; Future    Chronic constipation    Orders:    Ambulatory Referral to Colorectal Surgery; Future      Return for Next scheduled follow up.       History of Present Illness   Chief Complaint   Patient presents with    has pain in her body     Eased up since since she went to the bathroom she think it might be her bowels still have ache in back. This has been going on for 2 days        Back Pain  This is a new problem. The current episode started today. The problem occurs constantly. The problem has been gradually worsening since onset. The pain is present in the lumbar spine and sacro-iliac. The quality of the pain is described as aching. The pain does not radiate. The pain is at a severity of 7/10. The pain is severe. The pain is Worse during the day. The symptoms are aggravated by standing and twisting. Stiffness is present In the morning. Pertinent negatives include no abdominal pain, bladder incontinence, bowel incontinence,  dysuria, fever, headaches, leg pain, numbness, paresthesias, perianal numbness or weight loss. She has tried nothing for the symptoms. The treatment provided no relief.   Review of Systems   Constitutional:  Negative for fever, unexpected weight change and weight loss.   Gastrointestinal:  Positive for constipation. Negative for abdominal pain, anal bleeding, blood in stool, bowel incontinence, diarrhea, nausea, rectal pain and vomiting.   Genitourinary:  Negative for bladder incontinence, difficulty urinating, dysuria, hematuria and urgency.   Musculoskeletal:  Positive for back pain.   Neurological:  Negative for numbness, headaches and paresthesias.     Pain improved after 2 BM today. She admits she had to use digital maneuvers to pass puneet and felt a palpable area that she feels is blocking her stool.     Objective   /80 (BP Location: Right arm, Patient Position: Sitting, Cuff Size: Standard)   Pulse (!) 51   SpO2 95%      Physical Exam  Abdominal:      General: There is no distension.      Tenderness: There is no abdominal tenderness. There is no guarding or rebound.   Musculoskeletal:         General: Tenderness present. No swelling or deformity.        Arms:       Comments: Point tenderness along SP of T12-L2   Bilateral paraspinal muscle spasm in this area, worse on the left.   No muscle tenderness

## 2025-01-03 ENCOUNTER — RESULTS FOLLOW-UP (OUTPATIENT)
Dept: FAMILY MEDICINE CLINIC | Facility: CLINIC | Age: 80
End: 2025-01-03

## 2025-01-03 DIAGNOSIS — M54.50 ACUTE MIDLINE LOW BACK PAIN WITHOUT SCIATICA: Primary | ICD-10-CM

## 2025-01-03 DIAGNOSIS — K59.09 CHRONIC CONSTIPATION: ICD-10-CM

## 2025-01-03 DIAGNOSIS — R93.5 ABNORMAL X-RAY OF ABDOMEN: ICD-10-CM

## 2025-01-03 NOTE — TELEPHONE ENCOUNTER
Spoke to patient in regards to results of the xray and gave her the number to call for a CT scan so provider can see the effected area better and get a better idea for treatment

## 2025-01-03 NOTE — TELEPHONE ENCOUNTER
----- Message from Sri Lopes, DO sent at 1/3/2025 10:50 AM EST -----  Please let patient know I reviewed the xray results. There is a calcified area on the right that may be a kidney stone or could be within the bowel. We would need to get a CT scan to better visualize this as the xray is not best for viewing this. CT may help us better clarify what is seen here. I will place the order. Please assist her with scheduling this in [place].     Thank you,   Dr. Lopes

## 2025-01-04 DIAGNOSIS — F99 INSOMNIA DUE TO OTHER MENTAL DISORDER: ICD-10-CM

## 2025-01-04 DIAGNOSIS — F41.9 ANXIETY: ICD-10-CM

## 2025-01-04 DIAGNOSIS — F51.05 INSOMNIA DUE TO OTHER MENTAL DISORDER: ICD-10-CM

## 2025-01-06 RX ORDER — LORAZEPAM 0.5 MG/1
TABLET ORAL
Qty: 30 TABLET | Refills: 0 | Status: SHIPPED | OUTPATIENT
Start: 2025-01-06

## 2025-01-07 ENCOUNTER — HOSPITAL ENCOUNTER (OUTPATIENT)
Dept: CT IMAGING | Facility: HOSPITAL | Age: 80
Discharge: HOME | End: 2025-01-07
Payer: MEDICARE

## 2025-01-07 DIAGNOSIS — K59.09 CHRONIC CONSTIPATION: ICD-10-CM

## 2025-01-07 DIAGNOSIS — M54.50 ACUTE MIDLINE LOW BACK PAIN WITHOUT SCIATICA: ICD-10-CM

## 2025-01-07 DIAGNOSIS — R93.5 ABNORMAL X-RAY OF ABDOMEN: ICD-10-CM

## 2025-01-07 PROCEDURE — 74176 CT ABD & PELVIS W/O CONTRAST: CPT

## 2025-01-31 ENCOUNTER — RESULTS FOLLOW-UP (OUTPATIENT)
Dept: FAMILY MEDICINE CLINIC | Facility: CLINIC | Age: 80
End: 2025-01-31

## 2025-03-20 ENCOUNTER — RA CDI HCC (OUTPATIENT)
Dept: OTHER | Facility: HOSPITAL | Age: 80
End: 2025-03-20

## 2025-03-26 DIAGNOSIS — M70.62 TROCHANTERIC BURSITIS OF LEFT HIP: ICD-10-CM

## 2025-03-27 ENCOUNTER — OFFICE VISIT (OUTPATIENT)
Dept: FAMILY MEDICINE CLINIC | Facility: CLINIC | Age: 80
End: 2025-03-27
Payer: MEDICARE

## 2025-03-27 VITALS
SYSTOLIC BLOOD PRESSURE: 182 MMHG | OXYGEN SATURATION: 98 % | HEIGHT: 64 IN | WEIGHT: 171.74 LBS | HEART RATE: 58 BPM | DIASTOLIC BLOOD PRESSURE: 90 MMHG | BODY MASS INDEX: 29.32 KG/M2

## 2025-03-27 DIAGNOSIS — K86.2 PANCREATIC CYST: ICD-10-CM

## 2025-03-27 DIAGNOSIS — I51.7 LEFT VENTRICULAR HYPERTROPHY: ICD-10-CM

## 2025-03-27 DIAGNOSIS — M85.80 OSTEOPENIA AFTER MENOPAUSE: ICD-10-CM

## 2025-03-27 DIAGNOSIS — M70.62 TROCHANTERIC BURSITIS OF LEFT HIP: ICD-10-CM

## 2025-03-27 DIAGNOSIS — I42.2 HYPERTROPHIC CARDIOMYOPATHY (HCC): ICD-10-CM

## 2025-03-27 DIAGNOSIS — K76.89 HEPATIC CYST: ICD-10-CM

## 2025-03-27 DIAGNOSIS — Z00.00 MEDICARE ANNUAL WELLNESS VISIT, SUBSEQUENT: Primary | ICD-10-CM

## 2025-03-27 DIAGNOSIS — K59.09 CHRONIC CONSTIPATION: ICD-10-CM

## 2025-03-27 DIAGNOSIS — Z78.0 OSTEOPENIA AFTER MENOPAUSE: ICD-10-CM

## 2025-03-27 DIAGNOSIS — H91.93 BILATERAL HEARING LOSS, UNSPECIFIED HEARING LOSS TYPE: ICD-10-CM

## 2025-03-27 DIAGNOSIS — I1A.0 RESISTANT HYPERTENSION: ICD-10-CM

## 2025-03-27 DIAGNOSIS — E55.9 VITAMIN D DEFICIENCY: ICD-10-CM

## 2025-03-27 DIAGNOSIS — E78.2 MIXED HYPERLIPIDEMIA: ICD-10-CM

## 2025-03-27 DIAGNOSIS — K82.4 GALLBLADDER POLYP: ICD-10-CM

## 2025-03-27 DIAGNOSIS — N18.31 STAGE 3A CHRONIC KIDNEY DISEASE (HCC): ICD-10-CM

## 2025-03-27 PROBLEM — M25.552 PAIN OF LEFT HIP: Status: RESOLVED | Noted: 2023-09-13 | Resolved: 2025-03-27

## 2025-03-27 PROBLEM — R42 VERTIGO: Status: RESOLVED | Noted: 2023-08-08 | Resolved: 2025-03-27

## 2025-03-27 PROCEDURE — 99214 OFFICE O/P EST MOD 30 MIN: CPT | Performed by: FAMILY MEDICINE

## 2025-03-27 PROCEDURE — G2211 COMPLEX E/M VISIT ADD ON: HCPCS | Performed by: FAMILY MEDICINE

## 2025-03-27 PROCEDURE — G0439 PPPS, SUBSEQ VISIT: HCPCS | Performed by: FAMILY MEDICINE

## 2025-03-27 RX ORDER — CELECOXIB 200 MG/1
200 CAPSULE ORAL 2 TIMES DAILY
Qty: 60 CAPSULE | Refills: 0 | Status: SHIPPED | OUTPATIENT
Start: 2025-03-27

## 2025-03-27 NOTE — ASSESSMENT & PLAN NOTE
Lab Results   Component Value Date    EGFR 50 09/26/2024    EGFR 50 09/12/2023    EGFR 45.98 03/03/2023    CREATININE 1.04 09/26/2024    CREATININE 1.04 09/12/2023    CREATININE 1.14 03/03/2023     Chronic, stable  Continue mgmt HTN as above  Continue to monitor with labs    Orders:    Comprehensive metabolic panel; Future    CBC and differential; Future

## 2025-03-27 NOTE — ASSESSMENT & PLAN NOTE
Lab Results   Component Value Date    CHOLESTEROL 199 09/26/2024    TRIG 112 09/26/2024    HDL 50 09/26/2024    LDLCALC 127 (H) 09/26/2024     Overall, has been lifestyle controlled  Recommend continue with healthy lifestyle habits  Monitor lipid panel     Orders:    Lipid panel; Future

## 2025-03-27 NOTE — ASSESSMENT & PLAN NOTE
Chronic, Not well controlled  Current Blood Pressure: (!) 182/90  Follows with Cardiology at [facility] - provider retired - needs referral for new provider   Current meds: HCTZ 25mg daily, toprol XL 100mg daily, ramipril 10mg daily, verapamil 240mg daily    Plan:  BP resistant - recommend referral to nephrology - appreciate recommendations from cardiology and nephrology    Orders:    Comprehensive metabolic panel; Future    CBC and differential; Future    Ambulatory Referral to Nephrology; Future    Ambulatory Referral to Cardiology; Future    Echo complete w/ contrast if indicated; Future

## 2025-03-27 NOTE — ASSESSMENT & PLAN NOTE
"Reviwed office visit with surgical oncology 9/22/2024: \"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 Had recent MRI from gen surg - awaiting recommendations - appears stable and likely benign. Patient reports she declines any surgical intervention           "

## 2025-03-27 NOTE — ASSESSMENT & PLAN NOTE
Chronic, stable  Continue daily fiber supplementation, miralax daily   Continue to follow with GI as scheduled - [facility] in [place]   Continue to monitor for acute worsening of symptoms

## 2025-03-27 NOTE — ASSESSMENT & PLAN NOTE
Dexa 4/9/2024 - osteopenia    Continue vit D and Ca supplementation  Continue healthy lifestyle habits  Monitor dexa q2 years, will plan for repeat dexa 4/2026    Orders:    Vitamin D 25 hydroxy; Future

## 2025-03-27 NOTE — PATIENT INSTRUCTIONS

## 2025-03-27 NOTE — ASSESSMENT & PLAN NOTE
"Reviewed surgical oncology note from 6/2023: \"By US of 10/14/2022 she has multiple GB polyps, the largest 6 mm. This was not confirmed by MRI of 02/09/2023. If she does have gallbladder polyps, they have a chance to develop into cancer.  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 Gallbladder polyps warranting right upper quadrant sonogram on a nonemergent, outpatient basis.  US completed: There are a few gallbladder polyps, ranging in size up to 6 mm.   Gen Surg note from 9/22/24 reviewed: recommendation is for laparoscopic cholecystectomy due to the polyps being multiple, patient would like to continue observation which is reasonable as they have stayed the same size, will refer back to GI DDA for continued surveilance     At this time, patient remains asymptomatic. Continue to monitor for symptom development.     Orders:    Comprehensive metabolic panel; Future      "

## 2025-03-27 NOTE — ASSESSMENT & PLAN NOTE
"Chronic, follows with cardiology - needs referral for new provider  Echocardiogram 6/14/2022: \"moderate concentric left ventricular hypertrophy with ejection fraction of 80% with indeterminate diastolic parameters, minimal trace aortic regurgitation, trace to mild mitral regurgitation, trace tricuspid regurgitation with normal pulmonary pressure estimate. Overall, no significant change.\"     Plan: continue toprol XL 100mg daily, verapamil 240mg daily  Continue to monitor BMP, Lipid     Orders:    Ambulatory Referral to Cardiology; Future    Echo complete w/ contrast if indicated; Future      "

## 2025-03-27 NOTE — ASSESSMENT & PLAN NOTE
Melinda is asymptomatic with respect to the hypertrophy. Of importance, there is no outflow tract gradient and no dynamic change with Valsalva. Will continue to monitor. Follows with cardiology for monitoring Echos. Needs referral for new cardiology as her prior retired  Due for echo     Orders:    Ambulatory Referral to Cardiology; Future    Echo complete w/ contrast if indicated; Future

## 2025-03-27 NOTE — PROGRESS NOTES
"Name: Amanda Troncoso      : 1945      MRN: [id number]  Encounter Provider: Sri Lopes DO  Encounter Date: 3/27/2025   Encounter department: [facility] PRIMARY CARE    Assessment & Plan  Medicare annual wellness visit, subsequent         Left ventricular hypertrophy  Melinda is asymptomatic with respect to the hypertrophy. Of importance, there is no outflow tract gradient and no dynamic change with Valsalva. Will continue to monitor. Follows with cardiology for monitoring Echos. Needs referral for new cardiology as her prior retired  Due for echo     Orders:    Ambulatory Referral to Cardiology; Future    Echo complete w/ contrast if indicated; Future      Hypertrophic cardiomyopathy (HCC)  Chronic, follows with cardiology - needs referral for new provider  Echocardiogram 2022: \"moderate concentric left ventricular hypertrophy with ejection fraction of 80% with indeterminate diastolic parameters, minimal trace aortic regurgitation, trace to mild mitral regurgitation, trace tricuspid regurgitation with normal pulmonary pressure estimate. Overall, no significant change.\"     Plan: continue toprol XL 100mg daily, verapamil 240mg daily  Continue to monitor BMP, Lipid     Orders:    Ambulatory Referral to Cardiology; Future    Echo complete w/ contrast if indicated; Future      Resistant hypertension  Chronic, Not well controlled  Current Blood Pressure: (!) 182/90  [facility] - needs referral for new provider   Current meds: HCTZ 25mg daily, toprol XL 100mg daily, ramipril 10mg daily, verapamil 240mg daily    Plan:  BP resistant - recommend referral to nephrology - appreciate recommendations from cardiology and nephrology    Orders:    Comprehensive metabolic panel; Future    CBC and differential; Future    Ambulatory Referral to Nephrology; Future    Ambulatory Referral to Cardiology; Future    Echo complete w/ contrast if " "indicated; Future      Pancreatic cyst  Reviwed office visit with surgical oncology 9/22/2024: \"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 Had recent MRI from gen surg - awaiting recommendations - appears stable and likely benign. Patient reports she declines any surgical intervention           Hepatic cyst  Reviewed Surgical Oncology office visit 9/22/24:  Benigng liver cyst that is asymptomatic, continue to monitor for signs of symptoms which would indicate need for resectoin; Liver hemangioma, Asymptomatic, it is not exophytic, and less than 10 cm's. Recommend to continue to monitor for symptoms and imaging for this on as needed basis     Patient remains asymptomatic   Monitor CMP q6-12 mo, Monitor RUQ US annually, repeat 8/2025  Continue to monitor for development of symptoms           Gallbladder polyp  Reviewed surgical oncology note from 6/2023: \"By US of 10/14/2022 she has multiple GB polyps, the largest 6 mm. This was not confirmed by MRI of 02/09/2023. If she does have gallbladder polyps, they have a chance to develop into cancer.  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 Gallbladder polyps warranting right upper quadrant sonogram on a nonemergent, outpatient basis.  US completed: There are a few gallbladder polyps, ranging in size up to 6 mm.   Gen Surg note from 9/22/24 reviewed: recommendation is for laparoscopic cholecystectomy due to the polyps being multiple, patient would like to continue observation which is reasonable as they have stayed the same size, will refer back to GI DDA for continued surveilance     At this time, patient remains asymptomatic. Continue to monitor for symptom development.     Orders:    " Comprehensive metabolic panel; Future      Chronic constipation  Chronic, stable  Continue daily fiber supplementation, miralax daily   Continue to follow with GI as scheduled - [facility] [place]   Continue to monitor for acute worsening of symptoms           Bilateral hearing loss, unspecified hearing loss type  Has hearing aids b/l. Followed by ENT           Trochanteric bursitis of left hip  Continue celebrex - advised to minimize use in setting of HTN            Osteopenia after menopause  Dexa 4/9/2024 - osteopenia    Continue vit D and Ca supplementation  Continue healthy lifestyle habits  Monitor dexa q2 years, will plan for repeat dexa 4/2026    Orders:    Vitamin D 25 hydroxy; Future      Stage 3a chronic kidney disease (HCC)  Lab Results   Component Value Date    EGFR 50 09/26/2024    EGFR 50 09/12/2023    EGFR 45.98 03/03/2023    CREATININE 1.04 09/26/2024    CREATININE 1.04 09/12/2023    CREATININE 1.14 03/03/2023     Chronic, stable  Continue mgmt HTN as above  Continue to monitor with labs    Orders:    Comprehensive metabolic panel; Future    CBC and differential; Future      Vitamin D deficiency  History of low vitamin D >5 years ago  Recent labs have shown improvement with levels in 40's   Continue vitamin D supplementation daily 1000u  Mgmt of osteopenia as above  Monitor Vit D labs q12 months.     Orders:    Vitamin D 25 hydroxy; Future      Mixed hyperlipidemia  Lab Results   Component Value Date    CHOLESTEROL 199 09/26/2024    TRIG 112 09/26/2024    HDL 50 09/26/2024    LDLCALC 127 (H) 09/26/2024     Overall, has been lifestyle controlled  Recommend continue with healthy lifestyle habits  Monitor lipid panel     Orders:    Lipid panel; Future        Depression Screening and Follow-up Plan: Patient was screened for depression during today's encounter. They screened negative with a PHQ-2 score of 0.      Return in about 3 months (around 6/27/2025) for Recheck Chronic Conditions .    Preventive  health issues were discussed with patient, and age appropriate screening tests were ordered as noted in patient's After Visit Summary. Personalized health advice and appropriate referrals for health education or preventive services given if needed, as noted in patient's After Visit Summary.    History of Present Illness     HPI   Patient Care Team:  Sri Lopes DO as PCP - General (Family Medicine)    Review of Systems   Constitutional:  Negative for appetite change, chills, diaphoresis, fever and unexpected weight change.   Eyes:  Negative for visual disturbance.   Respiratory:  Negative for shortness of breath.    Cardiovascular:  Negative for chest pain and leg swelling.   Gastrointestinal:  Negative for constipation and diarrhea.   Endocrine: Negative for polydipsia and polyuria.   Genitourinary:  Negative for frequency.   Neurological:  Negative for dizziness, light-headedness and headaches.     Medical History Reviewed by provider this encounter:  Tobacco  Allergies  Meds  Problems  Med Hx  Surg Hx  Fam Hx       Annual Wellness Visit Questionnaire   Amanda is here for her Subsequent Wellness visit. Last Medicare Wellness visit information reviewed, patient interviewed and updates made to the record today.      Health Risk Assessment:   Patient rates overall health as good. Patient feels that their physical health rating is same. Patient is satisfied with their life. Eyesight was rated as same. Hearing was rated as slightly worse. Patient feels that their emotional and mental health rating is same. Patients states they are never, rarely angry. Patient states they are sometimes unusually tired/fatigued. Pain experienced in the last 7 days has been none. Patient states that she has experienced no weight loss or gain in last 6 months.     Depression Screening:   PHQ-2 Score: 0      Fall Risk Screening:   In the past year, patient has experienced: no history of falling in past year      Urinary  Incontinence Screening:   Patient has not leaked urine accidently in the last six months.     Home Safety:  Patient does not have trouble with stairs inside or outside of their home. Patient has working smoke alarms and has working carbon monoxide detector. Home safety hazards include: none.     Nutrition:   Current diet is Regular.     Medications:   Patient is currently taking over-the-counter supplements. OTC medications include: see medication list. Patient is able to manage medications.     Activities of Daily Living (ADLs)/Instrumental Activities of Daily Living (IADLs):   Walk and transfer into and out of bed and chair?: Yes  Dress and groom yourself?: Yes    Bathe or shower yourself?: Yes    Feed yourself? Yes  Do your laundry/housekeeping?: Yes  Manage your money, pay your bills and track your expenses?: Yes  Make your own meals?: Yes    Do your own shopping?: Yes    Previous Hospitalizations:   Any hospitalizations or ED visits within the last 12 months?: No      Cognitive Screening:   Provider or family/friend/caregiver concerned regarding cognition?: No    PREVENTIVE SCREENINGS      Cardiovascular Screening:    General: Screening Not Indicated and History Lipid Disorder      Diabetes Screening:     General: Screening Current      Cervical Cancer Screening:    General: Screening Not Indicated      Lung Cancer Screening:     General: Screening Not Indicated    Screening, Brief Intervention, and Referral to Treatment (SBIRT)     Screening  Typical number of drinks in a day: 0  Typical number of drinks in a week: 0  Interpretation: Low risk drinking behavior.    Single Item Drug Screening:  How often have you used an illegal drug (including marijuana) or a prescription medication for non-medical reasons in the past year? never    Single Item Drug Screen Score: 0  Interpretation: Negative screen for possible drug use disorder    Social Drivers of Health     Food Insecurity: No Food Insecurity (3/27/2025)     "Hunger Vital Sign     Worried About Running Out of Food in the Last Year: Never true     Ran Out of Food in the Last Year: Never true   Transportation Needs: No Transportation Needs (3/27/2025)    PRAPARE - Transportation     Lack of Transportation (Medical): No     Lack of Transportation (Non-Medical): No   Housing Stability: Low Risk  (3/27/2025)    Housing Stability Vital Sign     Unable to Pay for Housing in the Last Year: No     Number of Times Moved in the Last Year: 0     Homeless in the Last Year: No   Utilities: Not At Risk (3/27/2025)    [facility] Utilities     Threatened with loss of utilities: No     No results found.    Objective   BP (!) 182/90 (BP Location: Left arm, Patient Position: Sitting, Cuff Size: Standard)   Pulse 58   Ht 5' 3.5\" (1.613 m)   Wt 77.9 kg (171 lb 11.8 oz)   SpO2 98%   BMI 29.94 kg/m²     Physical Exam  Vitals reviewed.   Constitutional:       General: She is not in acute distress.     Appearance: She is well-developed and normal weight. She is not ill-appearing.   HENT:      Head: Normocephalic and atraumatic.      Right Ear: External ear normal.      Left Ear: External ear normal.      Nose: Nose normal.   Eyes:      Extraocular Movements: Extraocular movements intact.      Conjunctiva/sclera: Conjunctivae normal.   Neck:      Vascular: No carotid bruit or JVD.   Cardiovascular:      Rate and Rhythm: Normal rate and regular rhythm.      Heart sounds: Normal heart sounds. No murmur heard.  Pulmonary:      Effort: Pulmonary effort is normal.      Breath sounds: Normal breath sounds.   Abdominal:      General: Abdomen is flat.      Palpations: Abdomen is soft.   Musculoskeletal:      Cervical back: Neck supple.      Right lower leg: No edema.      Left lower leg: No edema.   Neurological:      General: No focal deficit present.      Mental Status: She is alert.   Psychiatric:         Mood and Affect: Mood normal.         Behavior: Behavior normal.         "

## 2025-03-28 DIAGNOSIS — N18.31 STAGE 3A CHRONIC KIDNEY DISEASE (HCC): ICD-10-CM

## 2025-03-28 DIAGNOSIS — I10 ESSENTIAL HYPERTENSION: Primary | ICD-10-CM

## 2025-04-02 ENCOUNTER — HOSPITAL ENCOUNTER (OUTPATIENT)
Dept: NON INVASIVE DIAGNOSTICS | Facility: HOSPITAL | Age: 80
Discharge: HOME | End: 2025-04-02
Payer: MEDICARE

## 2025-04-02 VITALS
HEIGHT: 63 IN | SYSTOLIC BLOOD PRESSURE: 182 MMHG | WEIGHT: 171 LBS | HEART RATE: 80 BPM | DIASTOLIC BLOOD PRESSURE: 90 MMHG | BODY MASS INDEX: 30.3 KG/M2

## 2025-04-02 DIAGNOSIS — I42.2 HYPERTROPHIC CARDIOMYOPATHY (HCC): ICD-10-CM

## 2025-04-02 DIAGNOSIS — I51.7 LEFT VENTRICULAR HYPERTROPHY: ICD-10-CM

## 2025-04-02 DIAGNOSIS — I1A.0 RESISTANT HYPERTENSION: ICD-10-CM

## 2025-04-02 LAB
AORTIC ROOT: 3.6 CM
AV REGURGITATION PRESSURE HALF TIME: 490 MS
BSA FOR ECHO PROCEDURE: 1.81 M2
DOP CALC LVOT AREA: 4.15 CM2
DOP CALC LVOT DIAMETER: 2.3 CM
E WAVE DECELERATION TIME: 286 MS
E/A RATIO: 0.76
FRACTIONAL SHORTENING: 55 (ref 28–44)
INTERVENTRICULAR SEPTUM IN DIASTOLE (PARASTERNAL SHORT AXIS VIEW): 2.1 CM
INTERVENTRICULAR SEPTUM: 2.1 CM (ref 0.6–1.1)
IVC: 20 MM
LAAS-AP2: 15.1 CM2
LAAS-AP4: 20.7 CM2
LEFT ATRIUM SIZE: 3.7 CM
LEFT ATRIUM VOLUME (MOD BIPLANE): 48 ML
LEFT ATRIUM VOLUME INDEX (MOD BIPLANE): 26.4 ML/M2
LEFT INTERNAL DIMENSION IN SYSTOLE: 2.5 CM (ref 2.1–4)
LEFT VENTRICULAR INTERNAL DIMENSION IN DIASTOLE: 5.5 CM (ref 3.5–6)
LEFT VENTRICULAR POSTERIOR WALL IN END DIASTOLE: 1.5 CM
LEFT VENTRICULAR STROKE VOLUME: 122 ML
LV EF US.2D.A4C+ESTIMATED: 83 %
LVSV (TEICH): 122 ML
MV E'TISSUE VEL-LAT: 3 CM/S
MV E'TISSUE VEL-SEP: 5 CM/S
MV PEAK A VEL: 1.01 M/S
MV PEAK E VEL: 77 CM/S
MV STENOSIS PRESSURE HALF TIME: 83 MS
MV VALVE AREA P 1/2 METHOD: 2.65
RA PRESSURE ESTIMATED: 3 MMHG
RIGHT ATRIUM AREA SYSTOLE A4C: 12.3 CM2
RIGHT VENTRICLE ID DIMENSION: 2.8 CM
RV PSP: 29 MMHG
SL CV AV DECELERATION TIME RETROGRADE: 1691 MS
SL CV AV PEAK GRADIENT RETROGRADE: 49 MMHG
SL CV LEFT ATRIUM LENGTH A2C: 5.1 CM
SL CV PED ECHO LEFT VENTRICLE DIASTOLIC VOLUME (MOD BIPLANE) 2D: 145 ML
SL CV PED ECHO LEFT VENTRICLE SYSTOLIC VOLUME (MOD BIPLANE) 2D: 23 ML
TR MAX PG: 26 MMHG
TR PEAK VELOCITY: 2.6 M/S
TRICUSPID ANNULAR PLANE SYSTOLIC EXCURSION: 1.8 CM
TRICUSPID VALVE PEAK REGURGITATION VELOCITY: 2.56 M/S

## 2025-04-02 PROCEDURE — 93306 TTE W/DOPPLER COMPLETE: CPT

## 2025-04-04 ENCOUNTER — APPOINTMENT (OUTPATIENT)
Dept: LAB | Facility: CLINIC | Age: 80
End: 2025-04-04
Payer: MEDICARE

## 2025-04-04 DIAGNOSIS — K82.4 GALLBLADDER POLYP: ICD-10-CM

## 2025-04-04 DIAGNOSIS — I1A.0 RESISTANT HYPERTENSION: ICD-10-CM

## 2025-04-04 DIAGNOSIS — E78.2 MIXED HYPERLIPIDEMIA: ICD-10-CM

## 2025-04-04 DIAGNOSIS — M85.80 OSTEOPENIA AFTER MENOPAUSE: ICD-10-CM

## 2025-04-04 DIAGNOSIS — N18.31 STAGE 3A CHRONIC KIDNEY DISEASE (HCC): ICD-10-CM

## 2025-04-04 DIAGNOSIS — Z78.0 OSTEOPENIA AFTER MENOPAUSE: ICD-10-CM

## 2025-04-04 DIAGNOSIS — E55.9 VITAMIN D DEFICIENCY: ICD-10-CM

## 2025-04-04 DIAGNOSIS — I10 ESSENTIAL HYPERTENSION: ICD-10-CM

## 2025-04-04 LAB
25(OH)D3 SERPL-MCNC: 44.8 NG/ML (ref 30–100)
ALBUMIN SERPL BCG-MCNC: 4.2 G/DL (ref 3.5–5)
ALP SERPL-CCNC: 77 U/L (ref 34–104)
ALT SERPL W P-5'-P-CCNC: 17 U/L (ref 7–52)
ANION GAP SERPL CALCULATED.3IONS-SCNC: 8 MMOL/L (ref 4–13)
AST SERPL W P-5'-P-CCNC: 17 U/L (ref 13–39)
BACTERIA UR QL AUTO: ABNORMAL /HPF
BASOPHILS # BLD AUTO: 0.05 THOUSANDS/ÂΜL (ref 0–0.1)
BASOPHILS NFR BLD AUTO: 1 % (ref 0–1)
BILIRUB SERPL-MCNC: 0.59 MG/DL (ref 0.2–1)
BILIRUB UR QL STRIP: NEGATIVE
BUN SERPL-MCNC: 31 MG/DL (ref 5–25)
CALCIUM SERPL-MCNC: 9.6 MG/DL (ref 8.4–10.2)
CHLORIDE SERPL-SCNC: 102 MMOL/L (ref 96–108)
CHOLEST SERPL-MCNC: 206 MG/DL (ref ?–200)
CLARITY UR: CLEAR
CO2 SERPL-SCNC: 30 MMOL/L (ref 21–32)
COLOR UR: YELLOW
CREAT SERPL-MCNC: 1.02 MG/DL (ref 0.6–1.3)
CREAT UR-MCNC: 120.5 MG/DL
CREAT UR-MCNC: 120.5 MG/DL
EOSINOPHIL # BLD AUTO: 0.14 THOUSAND/ÂΜL (ref 0–0.61)
EOSINOPHIL NFR BLD AUTO: 4 % (ref 0–6)
ERYTHROCYTE [DISTWIDTH] IN BLOOD BY AUTOMATED COUNT: 13.1 % (ref 11.6–15.1)
GFR SERPL CREATININE-BSD FRML MDRD: 51 ML/MIN/1.73SQ M
GLUCOSE P FAST SERPL-MCNC: 84 MG/DL (ref 65–99)
GLUCOSE UR STRIP-MCNC: NEGATIVE MG/DL
HCT VFR BLD AUTO: 46.4 % (ref 34.8–46.1)
HDLC SERPL-MCNC: 45 MG/DL
HGB BLD-MCNC: 15.6 G/DL (ref 11.5–15.4)
HGB UR QL STRIP.AUTO: NEGATIVE
HYALINE CASTS #/AREA URNS LPF: ABNORMAL /LPF
IMM GRANULOCYTES # BLD AUTO: 0.01 THOUSAND/UL (ref 0–0.2)
IMM GRANULOCYTES NFR BLD AUTO: 0 % (ref 0–2)
KETONES UR STRIP-MCNC: NEGATIVE MG/DL
LDLC SERPL CALC-MCNC: 138 MG/DL (ref 0–100)
LEUKOCYTE ESTERASE UR QL STRIP: NEGATIVE
LYMPHOCYTES # BLD AUTO: 1.45 THOUSANDS/ÂΜL (ref 0.6–4.47)
LYMPHOCYTES NFR BLD AUTO: 38 % (ref 14–44)
MAGNESIUM SERPL-MCNC: 2 MG/DL (ref 1.9–2.7)
MCH RBC QN AUTO: 32.3 PG (ref 26.8–34.3)
MCHC RBC AUTO-ENTMCNC: 33.6 G/DL (ref 31.4–37.4)
MCV RBC AUTO: 96 FL (ref 82–98)
MICROALBUMIN UR-MCNC: 21.5 MG/L
MICROALBUMIN/CREAT 24H UR: 18 MG/G CREATININE (ref 0–30)
MONOCYTES # BLD AUTO: 0.43 THOUSAND/ÂΜL (ref 0.17–1.22)
MONOCYTES NFR BLD AUTO: 11 % (ref 4–12)
NEUTROPHILS # BLD AUTO: 1.71 THOUSANDS/ÂΜL (ref 1.85–7.62)
NEUTS SEG NFR BLD AUTO: 46 % (ref 43–75)
NITRITE UR QL STRIP: NEGATIVE
NON-SQ EPI CELLS URNS QL MICRO: ABNORMAL /HPF
NONHDLC SERPL-MCNC: 161 MG/DL
NRBC BLD AUTO-RTO: 0 /100 WBCS
PH UR STRIP.AUTO: 6.5 [PH]
PHOSPHATE SERPL-MCNC: 3.6 MG/DL (ref 2.3–4.1)
PLATELET # BLD AUTO: 265 THOUSANDS/UL (ref 149–390)
PMV BLD AUTO: 11.2 FL (ref 8.9–12.7)
POTASSIUM SERPL-SCNC: 4.2 MMOL/L (ref 3.5–5.3)
PROT SERPL-MCNC: 7.1 G/DL (ref 6.4–8.4)
PROT UR STRIP-MCNC: ABNORMAL MG/DL
PROT UR-MCNC: 21.2 MG/DL
PROT/CREAT UR: 0.2 MG/G{CREAT} (ref 0–0.1)
PTH-INTACT SERPL-MCNC: 87.7 PG/ML (ref 12–88)
RBC # BLD AUTO: 4.83 MILLION/UL (ref 3.81–5.12)
RBC #/AREA URNS AUTO: ABNORMAL /HPF
SODIUM SERPL-SCNC: 140 MMOL/L (ref 135–147)
SP GR UR STRIP.AUTO: 1.02 (ref 1–1.03)
TRIGL SERPL-MCNC: 116 MG/DL (ref ?–150)
UROBILINOGEN UR STRIP-ACNC: <2 MG/DL
WBC # BLD AUTO: 3.79 THOUSAND/UL (ref 4.31–10.16)
WBC #/AREA URNS AUTO: ABNORMAL /HPF

## 2025-04-04 PROCEDURE — 83735 ASSAY OF MAGNESIUM: CPT

## 2025-04-04 PROCEDURE — 85025 COMPLETE CBC W/AUTO DIFF WBC: CPT

## 2025-04-04 PROCEDURE — 82570 ASSAY OF URINE CREATININE: CPT

## 2025-04-04 PROCEDURE — 82043 UR ALBUMIN QUANTITATIVE: CPT

## 2025-04-04 PROCEDURE — 80053 COMPREHEN METABOLIC PANEL: CPT

## 2025-04-04 PROCEDURE — 80061 LIPID PANEL: CPT

## 2025-04-04 PROCEDURE — 84156 ASSAY OF PROTEIN URINE: CPT

## 2025-04-04 PROCEDURE — 83970 ASSAY OF PARATHORMONE: CPT

## 2025-04-04 PROCEDURE — 36415 COLL VENOUS BLD VENIPUNCTURE: CPT

## 2025-04-04 PROCEDURE — 84100 ASSAY OF PHOSPHORUS: CPT

## 2025-04-04 PROCEDURE — 82306 VITAMIN D 25 HYDROXY: CPT

## 2025-04-04 PROCEDURE — 81001 URINALYSIS AUTO W/SCOPE: CPT

## 2025-04-12 ENCOUNTER — APPOINTMENT (EMERGENCY)
Dept: RADIOLOGY | Facility: HOSPITAL | Age: 80
End: 2025-04-12
Payer: MEDICARE

## 2025-04-12 ENCOUNTER — HOSPITAL ENCOUNTER (EMERGENCY)
Facility: HOSPITAL | Age: 80
Discharge: HOME | End: 2025-04-12
Attending: EMERGENCY MEDICINE
Payer: MEDICARE

## 2025-04-12 VITALS
RESPIRATION RATE: 18 BRPM | OXYGEN SATURATION: 94 % | DIASTOLIC BLOOD PRESSURE: 89 MMHG | TEMPERATURE: 99 F | HEART RATE: 65 BPM | SYSTOLIC BLOOD PRESSURE: 189 MMHG | BODY MASS INDEX: 30.29 KG/M2 | WEIGHT: 171 LBS

## 2025-04-12 DIAGNOSIS — J18.9 PNEUMONIA: Primary | ICD-10-CM

## 2025-04-12 LAB
FLUAV AG UPPER RESP QL IA.RAPID: NEGATIVE
FLUBV AG UPPER RESP QL IA.RAPID: NEGATIVE
SARS-COV+SARS-COV-2 AG RESP QL IA.RAPID: NEGATIVE

## 2025-04-12 PROCEDURE — 87804 INFLUENZA ASSAY W/OPTIC: CPT

## 2025-04-12 PROCEDURE — 71046 X-RAY EXAM CHEST 2 VIEWS: CPT

## 2025-04-12 PROCEDURE — 99284 EMERGENCY DEPT VISIT MOD MDM: CPT | Performed by: EMERGENCY MEDICINE

## 2025-04-12 PROCEDURE — 99283 EMERGENCY DEPT VISIT LOW MDM: CPT

## 2025-04-12 PROCEDURE — 87811 SARS-COV-2 COVID19 W/OPTIC: CPT

## 2025-04-12 PROCEDURE — 94640 AIRWAY INHALATION TREATMENT: CPT

## 2025-04-12 RX ORDER — OSELTAMIVIR PHOSPHATE 75 MG/1
75 CAPSULE ORAL ONCE
Status: COMPLETED | OUTPATIENT
Start: 2025-04-12 | End: 2025-04-12

## 2025-04-12 RX ORDER — OSELTAMIVIR PHOSPHATE 75 MG/1
75 CAPSULE ORAL EVERY 12 HOURS
Qty: 10 CAPSULE | Refills: 0 | Status: SHIPPED | OUTPATIENT
Start: 2025-04-12 | End: 2025-04-17

## 2025-04-12 RX ORDER — IPRATROPIUM BROMIDE AND ALBUTEROL SULFATE 2.5; .5 MG/3ML; MG/3ML
3 SOLUTION RESPIRATORY (INHALATION) 4 TIMES DAILY
Qty: 360 ML | Refills: 0 | Status: SHIPPED | OUTPATIENT
Start: 2025-04-12 | End: 2025-04-15

## 2025-04-12 RX ORDER — AZITHROMYCIN 250 MG/1
TABLET, FILM COATED ORAL
Qty: 6 TABLET | Refills: 0 | Status: SHIPPED | OUTPATIENT
Start: 2025-04-12 | End: 2025-04-12

## 2025-04-12 RX ORDER — BENZONATATE 200 MG/1
200 CAPSULE ORAL 3 TIMES DAILY PRN
Qty: 9 CAPSULE | Refills: 0 | Status: SHIPPED | OUTPATIENT
Start: 2025-04-12 | End: 2025-04-15

## 2025-04-12 RX ORDER — IPRATROPIUM BROMIDE AND ALBUTEROL SULFATE 2.5; .5 MG/3ML; MG/3ML
3 SOLUTION RESPIRATORY (INHALATION) ONCE
Status: DISCONTINUED | OUTPATIENT
Start: 2025-04-12 | End: 2025-04-12 | Stop reason: HOSPADM

## 2025-04-12 RX ORDER — PREDNISONE 20 MG/1
40 TABLET ORAL DAILY
Qty: 8 TABLET | Refills: 0 | Status: SHIPPED | OUTPATIENT
Start: 2025-04-12 | End: 2025-04-15

## 2025-04-12 RX ORDER — BENZONATATE 100 MG/1
200 CAPSULE ORAL ONCE
Status: COMPLETED | OUTPATIENT
Start: 2025-04-12 | End: 2025-04-12

## 2025-04-12 RX ORDER — AZITHROMYCIN 250 MG/1
250 TABLET, FILM COATED ORAL EVERY 24 HOURS
Qty: 4 TABLET | Refills: 0 | Status: SHIPPED | OUTPATIENT
Start: 2025-04-13 | End: 2025-04-15

## 2025-04-12 RX ORDER — AZITHROMYCIN 250 MG/1
500 TABLET, FILM COATED ORAL ONCE
Status: COMPLETED | OUTPATIENT
Start: 2025-04-12 | End: 2025-04-12

## 2025-04-12 RX ADMIN — OSELTAMAVIR PHOSPHATE 75 MG: 75 CAPSULE ORAL at 16:14

## 2025-04-12 RX ADMIN — BENZONATATE 200 MG: 100 CAPSULE ORAL at 14:59

## 2025-04-12 RX ADMIN — AZITHROMYCIN DIHYDRATE 500 MG: 250 TABLET ORAL at 16:14

## 2025-04-12 NOTE — ED PROVIDER NOTES
Time reflects when diagnosis was documented in both MDM as applicable and the Disposition within this note       Time User Action Codes Description Comment    4/12/2025  3:34 PM Era Davila Add [J18.9] Pneumonia           ED Disposition       ED Disposition   Discharge    Condition   Stable    Date/Time   Sat Apr 12, 2025  3:34 PM    Comment   Amanda Troncoso discharge to home/self care.                   Assessment & Plan       Medical Decision Making  Differential diagnoses include but not limited to: Pneumonia, bronchitis, viral syndrome    Amount and/or Complexity of Data Reviewed  Radiology: ordered and independent interpretation performed.    Risk  Prescription drug management.             Medications   ipratropium-albuterol (DUO-NEB) 0.5-2.5 mg/3 mL inhalation solution 3 mL (3 mL Nebulization Not Given 4/12/25 0387)   benzonatate (TESSALON PERLES) capsule 200 mg (200 mg Oral Given 4/12/25 1875)       ED Risk Strat Scores                    No data recorded        SBIRT 22yo+      Flowsheet Row Most Recent Value   Initial Alcohol Screen: US AUDIT-C     1. How often do you have a drink containing alcohol? 0 Filed at: 04/12/2025 1106   2. How many drinks containing alcohol do you have on a typical day you are drinking?  0 Filed at: 04/12/2025 1106   3b. FEMALE Any Age, or MALE 65+: How often do you have 4 or more drinks on one occassion? 0 Filed at: 04/12/2025 1106   Audit-C Score 0 Filed at: 04/12/2025 1106   JOE: How many times in the past year have you...    Used an illegal drug or used a prescription medication for non-medical reasons? Never Filed at: 04/12/2025 1106                            History of Present Illness       Chief Complaint   Patient presents with    Cough     Patient presents to the ED with complaints of cough that began a few days ago.        Past Medical History:   Diagnosis Date    Hypertension     Vertigo       Past Surgical History:   Procedure Laterality Date    APPENDECTOMY       HYSTERECTOMY      JOINT REPLACEMENT        History reviewed. No pertinent family history.   Social History     Tobacco Use    Smoking status: Never    Smokeless tobacco: Never   Vaping Use    Vaping status: Never Used   Substance Use Topics    Alcohol use: Yes     Comment: socially    Drug use: Not Currently      E-Cigarette/Vaping    E-Cigarette Use Never User       E-Cigarette/Vaping Substances    Nicotine No     THC No     CBD No     Flavoring No     Other No     Unknown No       I have reviewed and agree with the history as documented.     This is an 79-year-old female presenting to the ED for evaluation of persistent intermittent cough that began 3 days ago.  Patient denies any fever or chills.  States that her  was ill with similar symptoms prior to her becoming symptomatic.  She denies any chest pain.        Review of Systems   Constitutional:  Negative for chills and fever.   HENT:  Negative for ear pain and sore throat.    Eyes:  Negative for pain and visual disturbance.   Respiratory:  Positive for cough. Negative for shortness of breath.    Cardiovascular:  Negative for chest pain and palpitations.   Gastrointestinal:  Negative for abdominal pain and vomiting.   Genitourinary:  Negative for dysuria and hematuria.   Musculoskeletal:  Negative for arthralgias and back pain.   Skin:  Negative for color change and rash.   Neurological:  Negative for seizures and syncope.   All other systems reviewed and are negative.          Objective       ED Triage Vitals [04/12/25 1107]   Temperature Pulse Blood Pressure Respirations SpO2 Patient Position - Orthostatic VS   99 °F (37.2 °C) 71 156/97 18 95 % Sitting      Temp Source Heart Rate Source BP Location FiO2 (%) Pain Score    Temporal Monitor Left arm -- No Pain      Vitals      Date and Time Temp Pulse SpO2 Resp BP Pain Score FACES Pain Rating User   04/12/25 1336 -- 65 95 % 18 215/85 -- -- CG   04/12/25 1107 99 °F (37.2 °C) 71 95 % 18 156/97 No Pain -- RR             Physical Exam  Vitals and nursing note reviewed.   Constitutional:       General: She is not in acute distress.     Appearance: Normal appearance. She is well-developed. She is not ill-appearing, toxic-appearing or diaphoretic.   HENT:      Head: Normocephalic and atraumatic.      Right Ear: External ear normal.      Left Ear: External ear normal.      Nose: Nose normal.   Eyes:      Extraocular Movements: Extraocular movements intact.      Conjunctiva/sclera: Conjunctivae normal.   Cardiovascular:      Rate and Rhythm: Normal rate and regular rhythm.      Pulses: Normal pulses.      Heart sounds: Normal heart sounds. No murmur heard.  Pulmonary:      Effort: Pulmonary effort is normal. No respiratory distress.      Breath sounds: Normal breath sounds. No stridor. No wheezing, rhonchi or rales.   Chest:      Chest wall: No tenderness.   Abdominal:      General: Abdomen is flat. Bowel sounds are normal.      Palpations: Abdomen is soft. There is no mass.      Tenderness: There is no abdominal tenderness.   Musculoskeletal:         General: No swelling, tenderness, deformity or signs of injury. Normal range of motion.      Cervical back: Normal range of motion and neck supple.      Right lower leg: No edema.   Skin:     General: Skin is warm and dry.      Capillary Refill: Capillary refill takes less than 2 seconds.   Neurological:      General: No focal deficit present.      Mental Status: She is alert and oriented to person, place, and time. Mental status is at baseline.   Psychiatric:         Mood and Affect: Mood normal.         Results Reviewed       Procedure Component Value Units Date/Time    FLU/COVID Rapid Antigen (30 min. TAT) - Preferred screening test in ED [[id number]]  (Normal) Collected: 04/12/25 1108    Lab Status: Final result Specimen: Nares from Nose Updated: 04/12/25 1128     SARS COV Rapid Antigen Negative     Influenza A Rapid Antigen Negative     Influenza B Rapid Antigen Negative     Narrative:      This test has been performed using the [employer] Connie 2 FLU+SARS Antigen test under the Emergency Use Authorization (EUA). This test has been validated by the  and verified by the performing laboratory. The Connie uses lateral flow immunofluorescent sandwich assay to detect SARS-COV, Influenza A and Influenza B Antigen.     The Quidel Connie 2 SARS Antigen test does not differentiate between SARS-CoV and SARS-CoV-2.     Negative results are presumptive and may be confirmed with a molecular assay, if necessary, for patient management. Negative results do not rule out SARS-CoV-2 or influenza infection and should not be used as the sole basis for treatment or patient management decisions. A negative test result may occur if the level of antigen in a sample is below the limit of detection of this test.     Positive results are indicative of the presence of viral antigens, but do not rule out bacterial infection or co-infection with other viruses.     All test results should be used as an adjunct to clinical observations and other information available to the provider.    FOR PEDIATRIC PATIENTS - copy/paste COVID Guidelines URL to browser: [email or web address]            XR chest 2 views   ED Interpretation by Era Davila DO (04/12 1534)   +infiltrate            Procedures    ED Medication and Procedure Management   Prior to Admission Medications   Prescriptions Last Dose Informant Patient Reported? Taking?   Ascorbic Acid (DUARTE-C PO)   Yes No   Sig: Take by mouth   Ascorbic Acid (vitamin C) 1000 MG tablet   Yes No   Sig: Take 500 mg by mouth daily   Calcium Carb-Cholecalciferol (Calcium + Vitamin D3) 500-5 MG-MCG TABS   Yes No   Sig: Take by mouth   LORazepam (ATIVAN) 0.5 mg tablet   No No   Sig: TAKE 1 TABLET BY MOUTH DAILY AT BEDTIME AS NEEDED FOR ANXIETY   celecoxib (CeleBREX) 200 mg capsule   No No   Sig: TAKE 1 CAPSULE BY  MOUTH TWICE A DAY   cholecalciferol (VITAMIN D3) 1,000 units tablet   Yes No   Sig: Take 5,000 Units by mouth daily   hydroCHLOROthiazide 25 mg tablet   No No   Sig: TAKE 1 TABLET (25 MG TOTAL) BY MOUTH DAILY.   metoprolol succinate (TOPROL-XL) 100 mg 24 hr tablet   No No   Sig: TAKE 1 TABLET BY MOUTH EVERY DAY   ramipril (ALTACE) 10 MG capsule   No No   Sig: TAKE 1 CAPSULE BY MOUTH EVERY DAY   verapamil (Verelan) 240 MG 24 hr capsule   No No   Sig: TAKE 1 CAPSULE BY MOUTH EVERY DAY      Facility-Administered Medications: None     Patient's Medications   Discharge Prescriptions    AZITHROMYCIN (ZITHROMAX) 250 MG TABLET    Take 2 tablets today then 1 tablet daily x 4 days       Start Date: 4/12/2025 End Date: 4/16/2025       Order Dose: --       Quantity: 6 tablet    Refills: 0    BENZONATATE (TESSALON) 200 MG CAPSULE    Take 1 capsule (200 mg total) by mouth 3 (three) times a day as needed for cough for up to 3 days       Start Date: 4/12/2025 End Date: 4/15/2025       Order Dose: 200 mg       Quantity: 9 capsule    Refills: 0    IPRATROPIUM-ALBUTEROL (DUO-NEB) 0.5-2.5 MG/3 ML NEBULIZER SOLUTION    Take 3 mL by nebulization 4 (four) times a day       Start Date: 4/12/2025 End Date: --       Order Dose: 3 mL       Quantity: 360 mL    Refills: 0    PREDNISONE 20 MG TABLET    Take 2 tablets (40 mg total) by mouth daily for 4 days       Start Date: 4/12/2025 End Date: 4/16/2025       Order Dose: 40 mg       Quantity: 8 tablet    Refills: 0     No discharge procedures on file.  ED SEPSIS DOCUMENTATION   Time reflects when diagnosis was documented in both MDM as applicable and the Disposition within this note       Time User Action Codes Description Comment    4/12/2025  3:34 PM Era Davila [J18.9] Pneumonia                  Era Davila DO  04/12/25 1539

## 2025-04-15 ENCOUNTER — CONSULT (OUTPATIENT)
Age: 80
End: 2025-04-15
Payer: MEDICARE

## 2025-04-15 VITALS
SYSTOLIC BLOOD PRESSURE: 172 MMHG | BODY MASS INDEX: 29.19 KG/M2 | DIASTOLIC BLOOD PRESSURE: 98 MMHG | HEART RATE: 63 BPM | HEIGHT: 64 IN | TEMPERATURE: 97.8 F | OXYGEN SATURATION: 96 % | WEIGHT: 171 LBS

## 2025-04-15 DIAGNOSIS — J18.9 PNEUMONIA: ICD-10-CM

## 2025-04-15 DIAGNOSIS — I51.7 LEFT VENTRICULAR HYPERTROPHY: ICD-10-CM

## 2025-04-15 DIAGNOSIS — F99 INSOMNIA DUE TO OTHER MENTAL DISORDER: ICD-10-CM

## 2025-04-15 DIAGNOSIS — I12.9 HYPERTENSIVE KIDNEY DISEASE WITH STAGE 3A CHRONIC KIDNEY DISEASE (HCC): ICD-10-CM

## 2025-04-15 DIAGNOSIS — N18.31 STAGE 3A CHRONIC KIDNEY DISEASE (HCC): Primary | ICD-10-CM

## 2025-04-15 DIAGNOSIS — F51.05 INSOMNIA DUE TO OTHER MENTAL DISORDER: ICD-10-CM

## 2025-04-15 DIAGNOSIS — F41.9 ANXIETY: ICD-10-CM

## 2025-04-15 DIAGNOSIS — N18.31 HYPERTENSIVE KIDNEY DISEASE WITH STAGE 3A CHRONIC KIDNEY DISEASE (HCC): ICD-10-CM

## 2025-04-15 DIAGNOSIS — I1A.0 RESISTANT HYPERTENSION: ICD-10-CM

## 2025-04-15 PROCEDURE — 99204 OFFICE O/P NEW MOD 45 MIN: CPT | Performed by: INTERNAL MEDICINE

## 2025-04-15 PROCEDURE — G2211 COMPLEX E/M VISIT ADD ON: HCPCS | Performed by: INTERNAL MEDICINE

## 2025-04-15 RX ORDER — IPRATROPIUM BROMIDE AND ALBUTEROL SULFATE 2.5; .5 MG/3ML; MG/3ML
3 SOLUTION RESPIRATORY (INHALATION) AS NEEDED
Start: 2025-04-15

## 2025-04-15 RX ORDER — IRBESARTAN 150 MG/1
150 TABLET ORAL DAILY
Qty: 30 TABLET | Refills: 3 | Status: SHIPPED | OUTPATIENT
Start: 2025-04-15

## 2025-04-15 RX ORDER — LORAZEPAM 0.5 MG/1
0.5 TABLET ORAL AS NEEDED
Start: 2025-04-15

## 2025-04-15 NOTE — PROGRESS NOTES
[facility]  Gómez Almanzar DO    Name: Amanda Troncoso  YOB: 1945      Assessment/Plan:    Hypertensive kidney disease with chronic kidney disease stage III (HCC)  Lab Results   Component Value Date    EGFR 51 04/04/2025    EGFR 50 09/26/2024    EGFR 50 09/12/2023    CREATININE 1.02 04/04/2025    CREATININE 1.04 09/26/2024    CREATININE 1.04 09/12/2023     Patient currently has controlled hypertension.  This may have started after transitioning from quinapril to ramipril, the latter being known for less effective blood pressure control management.  However, in light of patient's current illness with coughing, we will transition her from ACE inhibitor to angiotensin receptor blocker and start irbesartan 150 mg daily.  Patient is aware that this is the medium dose, and although it is unexpected that it is too high of a dose for her, if she notices her blood pressures coming down rather rapidly and under 120 mmHg systolic, she will cut the tablet in half and only take 75 mg.  However, I anticipate potentially needing to increase the irbesartan further to 300 mg given current blood pressure readings.    Continue with current dosing of hydrochlorothiazide 25 mg daily, will potentially combine the irbesartan hydrochlorothiazide into 1 tablet going forward.  Will also continue the metoprolol succinate at current dosing of 100 mg daily, and verapamil 240 mg daily.    Regarding verapamil, patient denies history of Raynaud's phenomenon, as well as coronary artery arterial spasm, if additional blood pressure control management is needed, we can consider transitioning the patient from verapamil to either nifedipine or amlodipine.    We reviewed the patient's diet, there are certainly areas that can be improved upon with respect to sodium restriction.  She will also try to do this going forward.    Stage 3a chronic kidney disease (HCC)  Lab Results   Component Value Date     EGFR 51 04/04/2025    EGFR 50 09/26/2024    EGFR 50 09/12/2023    CREATININE 1.02 04/04/2025    CREATININE 1.04 09/26/2024    CREATININE 1.04 09/12/2023     Patient has reduced kidney function given her age.  Baseline creatinine appears to be between 1-1.1 mg/dL.  Will continue to work on optimizing care in general.  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    Left ventricular hypertrophy  Continue to optimize medical care and management.  As noted above we will maintain RAAS inhibition with angiotensin receptor blocker, transition from ACE inhibitor.  Continue with metoprolol.  Will continue improved patient's blood pressures in general, and then reassess echocardiogram at some point in the future.         Problem List Items Addressed This Visit          Cardiovascular and Mediastinum    Left ventricular hypertrophy    Continue to optimize medical care and management.  As noted above we will maintain RAAS inhibition with angiotensin receptor blocker, transition from ACE inhibitor.  Continue with metoprolol.  Will continue improved patient's blood pressures in general, and then reassess echocardiogram at some point in the future.            Genitourinary    Stage 3a chronic kidney disease (HCC) - Primary    Lab Results   Component Value Date    EGFR 51 04/04/2025    EGFR 50 09/26/2024    EGFR 50 09/12/2023    CREATININE 1.02 04/04/2025    CREATININE 1.04 09/26/2024    CREATININE 1.04 09/12/2023     Patient has reduced kidney function given her age.  Baseline creatinine appears to be between 1-1.1 mg/dL.  Will continue to work on optimizing care in general.  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         Relevant Orders    Magnesium    Comprehensive metabolic panel     Albumin / creatinine urine ratio    Urinalysis with microscopic    Hypertensive kidney disease with chronic kidney disease stage III (HCC)    Lab Results   Component Value Date    EGFR 51 04/04/2025    EGFR 50 09/26/2024    EGFR 50 09/12/2023    CREATININE 1.02 04/04/2025    CREATININE 1.04 09/26/2024    CREATININE 1.04 09/12/2023     Patient currently has controlled hypertension.  This may have started after transitioning from quinapril to ramipril, the latter being known for less effective blood pressure control management.  However, in light of patient's current illness with coughing, we will transition her from ACE inhibitor to angiotensin receptor blocker and start irbesartan 150 mg daily.  Patient is aware that this is the medium dose, and although it is unexpected that it is too high of a dose for her, if she notices her blood pressures coming down rather rapidly and under 120 mmHg systolic, she will cut the tablet in half and only take 75 mg.  However, I anticipate potentially needing to increase the irbesartan further to 300 mg given current blood pressure readings.    Continue with current dosing of hydrochlorothiazide 25 mg daily, will potentially combine the irbesartan hydrochlorothiazide into 1 tablet going forward.  Will also continue the metoprolol succinate at current dosing of 100 mg daily, and verapamil 240 mg daily.    Regarding verapamil, patient denies history of Raynaud's phenomenon, as well as coronary artery arterial spasm, if additional blood pressure control management is needed, we can consider transitioning the patient from verapamil to either nifedipine or amlodipine.    We reviewed the patient's diet, there are certainly areas that can be improved upon with respect to sodium restriction.  She will also try to do this going forward.         Relevant Medications    irbesartan (AVAPRO) 150 mg tablet     Other Visit Diagnoses         Resistant hypertension        Relevant Medications     irbesartan (AVAPRO) 150 mg tablet    Other Relevant Orders    VAS renal artery complete    Aldosterone/Renin Activity Ratio      Pneumonia        Relevant Medications    ipratropium-albuterol (DUO-NEB) 0.5-2.5 mg/3 mL nebulizer solution      Insomnia due to other mental disorder        Relevant Medications    LORazepam (ATIVAN) 0.5 mg tablet      Anxiety        Relevant Medications    LORazepam (ATIVAN) 0.5 mg tablet            Thank you for allowing us to participate in the care of your patient.  Medication changes today included transitioning off of ramipril and starting irbesartan 150 mg daily.  Will further workup the patient high blood pressure with a renal artery Doppler, and check/rule out hyperaldosteronism.  We will see her back in a few months for a regular visit.  In the meantime, she will let us know how she is doing with her blood pressures, and we will adjust medications in the meantime.    Subjective:      Patient ID: Amanda Troncoso is a 79 y.o. female.    Patient presents for initial evaluation regarding difficult to control hypertension.    Patient noted that it has been more recently that her blood pressures have not been well-controlled.  With respect to medication changes, patient noted that around the same time she was switched from quinapril to ramipril, otherwise no other significant finding was identified.     We reviewed labs in detail, most recent creatinine noted to be 1.02 mg/dL, which places estimated GFR 51 mL/min.    There were no significant electrolyte abnormalities noted.  Patient is taking all medications as prescribed with no specific side effects and denies use of nonsteroid anti-inflammatory medications.              The following portions of the patient's history were reviewed and updated as appropriate: allergies, current medications, past family history, past medical history, past social history, past surgical history and problem list.    Review of Systems   All other systems  reviewed and are negative.        Social History     Socioeconomic History    Marital status: /Civil Union     Spouse name: None    Number of children: None    Years of education: None    Highest education level: None   Occupational History    None   Tobacco Use    Smoking status: Never    Smokeless tobacco: Never   Vaping Use    Vaping status: Never Used   Substance and Sexual Activity    Alcohol use: Yes     Comment: socially    Drug use: Not Currently    Sexual activity: Not Currently   Other Topics Concern    None   Social History Narrative    None     Social Drivers of Health     Financial Resource Strain: Not on file   Food Insecurity: No Food Insecurity (3/27/2025)    Hunger Vital Sign     Worried About Running Out of Food in the Last Year: Never true     Ran Out of Food in the Last Year: Never true   Transportation Needs: No Transportation Needs (3/27/2025)    PRAPARE - Transportation     Lack of Transportation (Medical): No     Lack of Transportation (Non-Medical): No   Physical Activity: Not on file   Stress: Not on file   Social Connections: Not on file   Intimate Partner Violence: Not on file   Housing Stability: Low Risk  (3/27/2025)    Housing Stability Vital Sign     Unable to Pay for Housing in the Last Year: No     Number of Times Moved in the Last Year: 0     Homeless in the Last Year: No     Past Medical History:   Diagnosis Date    Hypertension     Vertigo      Past Surgical History:   Procedure Laterality Date    APPENDECTOMY      HYSTERECTOMY      JOINT REPLACEMENT         Current Outpatient Medications:     Ascorbic Acid (DUARTE-C PO), Take by mouth, Disp: , Rfl:     Calcium Carb-Cholecalciferol (Calcium + Vitamin D3) 500-5 MG-MCG TABS, Take by mouth, Disp: , Rfl:     cholecalciferol (VITAMIN D3) 1,000 units tablet, Take 5,000 Units by mouth daily, Disp: , Rfl:     hydroCHLOROthiazide 25 mg tablet, TAKE 1 TABLET (25 MG TOTAL) BY MOUTH DAILY., Disp: 90 tablet, Rfl: 1     "ipratropium-albuterol (DUO-NEB) 0.5-2.5 mg/3 mL nebulizer solution, Take 3 mL by nebulization if needed for shortness of breath, Disp: , Rfl:     irbesartan (AVAPRO) 150 mg tablet, Take 1 tablet (150 mg total) by mouth daily, Disp: 30 tablet, Rfl: 3    LORazepam (ATIVAN) 0.5 mg tablet, Take 1 tablet (0.5 mg total) by mouth if needed for anxiety, Disp: , Rfl:     metoprolol succinate (TOPROL-XL) 100 mg 24 hr tablet, TAKE 1 TABLET BY MOUTH EVERY DAY, Disp: 90 tablet, Rfl: 1    verapamil (Verelan) 240 MG 24 hr capsule, TAKE 1 CAPSULE BY MOUTH EVERY DAY, Disp: 90 capsule, Rfl: 1    Lab Results   Component Value Date    SODIUM 140 04/04/2025    K 4.2 04/04/2025     04/04/2025    CO2 30 04/04/2025    AGAP 8 04/04/2025    BUN 31 (H) 04/04/2025    CREATININE 1.02 04/04/2025    GLUC 87 03/03/2023    GLUF 84 04/04/2025    CALCIUM 9.6 04/04/2025    AST 17 04/04/2025    ALT 17 04/04/2025    ALKPHOS 77 04/04/2025    TP 7.1 04/04/2025    TBILI 0.59 04/04/2025    EGFR 51 04/04/2025     Lab Results   Component Value Date    WBC 3.79 (L) 04/04/2025    HGB 15.6 (H) 04/04/2025    HCT 46.4 (H) 04/04/2025    MCV 96 04/04/2025     04/04/2025     Lab Results   Component Value Date    CHOLESTEROL 206 (H) 04/04/2025    CHOLESTEROL 199 09/26/2024    CHOLESTEROL 191 09/12/2023     Lab Results   Component Value Date    HDL 45 (L) 04/04/2025    HDL 50 09/26/2024    HDL 45 (L) 09/12/2023     Lab Results   Component Value Date    LDLCALC 138 (H) 04/04/2025    LDLCALC 127 (H) 09/26/2024    LDLCALC 126 (H) 09/12/2023     Lab Results   Component Value Date    TRIG 116 04/04/2025    TRIG 112 09/26/2024    TRIG 98 09/12/2023     No results found for: \"CHOLHDL\"  No results found for: \"LUJ2TIGUMEYM\", \"TSH\"  Lab Results   Component Value Date    PTH 87.7 04/04/2025    CALCIUM 9.6 04/04/2025    PHOS 3.6 04/04/2025     No results found for: \"SPEP\", \"UPEP\"  No results found for: \"MICROALBUR\", \"JCLU98OVY\"        Objective:      BP (!) 172/98 (BP " "Location: Left arm, Patient Position: Sitting, Cuff Size: Standard)   Pulse 63   Temp 97.8 °F (36.6 °C) (Temporal)   Ht 5' 4\" (1.626 m)   Wt 77.6 kg (171 lb)   SpO2 96%   BMI 29.35 kg/m²          Physical Exam  Vitals reviewed.   Constitutional:       General: She is not in acute distress.     Appearance: Normal appearance.   HENT:      Head: Normocephalic and atraumatic.      Right Ear: External ear normal.      Left Ear: External ear normal.   Eyes:      Conjunctiva/sclera: Conjunctivae normal.   Cardiovascular:      Rate and Rhythm: Normal rate and regular rhythm.      Heart sounds: Normal heart sounds.   Pulmonary:      Effort: No respiratory distress.      Breath sounds: No wheezing.   Abdominal:      Palpations: Abdomen is soft.   Skin:     General: Skin is warm and dry.   Neurological:      General: No focal deficit present.      Mental Status: She is alert and oriented to person, place, and time.   Psychiatric:         Mood and Affect: Mood normal.         Behavior: Behavior normal.         "

## 2025-04-18 NOTE — ASSESSMENT & PLAN NOTE
Lab Results   Component Value Date    EGFR 51 04/04/2025    EGFR 50 09/26/2024    EGFR 50 09/12/2023    CREATININE 1.02 04/04/2025    CREATININE 1.04 09/26/2024    CREATININE 1.04 09/12/2023     Patient currently has controlled hypertension.  This may have started after transitioning from quinapril to ramipril, the latter being known for less effective blood pressure control management.  However, in light of patient's current illness with coughing, we will transition her from ACE inhibitor to angiotensin receptor blocker and start irbesartan 150 mg daily.  Patient is aware that this is the medium dose, and although it is unexpected that it is too high of a dose for her, if she notices her blood pressures coming down rather rapidly and under 120 mmHg systolic, she will cut the tablet in half and only take 75 mg.  However, I anticipate potentially needing to increase the irbesartan further to 300 mg given current blood pressure readings.    Continue with current dosing of hydrochlorothiazide 25 mg daily, will potentially combine the irbesartan hydrochlorothiazide into 1 tablet going forward.  Will also continue the metoprolol succinate at current dosing of 100 mg daily, and verapamil 240 mg daily.    Regarding verapamil, patient denies history of Raynaud's phenomenon, as well as coronary artery arterial spasm, if additional blood pressure control management is needed, we can consider transitioning the patient from verapamil to either nifedipine or amlodipine.    We reviewed the patient's diet, there are certainly areas that can be improved upon with respect to sodium restriction.  She will also try to do this going forward.

## 2025-04-18 NOTE — ASSESSMENT & PLAN NOTE
Continue to optimize medical care and management.  As noted above we will maintain RAAS inhibition with angiotensin receptor blocker, transition from ACE inhibitor.  Continue with metoprolol.  Will continue improved patient's blood pressures in general, and then reassess echocardiogram at some point in the future.

## 2025-04-18 NOTE — ASSESSMENT & PLAN NOTE
Lab Results   Component Value Date    EGFR 51 04/04/2025    EGFR 50 09/26/2024    EGFR 50 09/12/2023    CREATININE 1.02 04/04/2025    CREATININE 1.04 09/26/2024    CREATININE 1.04 09/12/2023     Patient has reduced kidney function given her age.  Baseline creatinine appears to be between 1-1.1 mg/dL.  Will continue to work on optimizing care in general.  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

## 2025-04-25 ENCOUNTER — RESULTS FOLLOW-UP (OUTPATIENT)
Dept: FAMILY MEDICINE CLINIC | Facility: CLINIC | Age: 80
End: 2025-04-25

## 2025-05-07 ENCOUNTER — HOSPITAL ENCOUNTER (OUTPATIENT)
Dept: NON INVASIVE DIAGNOSTICS | Facility: HOSPITAL | Age: 80
Discharge: HOME | End: 2025-05-07
Attending: INTERNAL MEDICINE
Payer: MEDICARE

## 2025-05-07 DIAGNOSIS — I1A.0 RESISTANT HYPERTENSION: ICD-10-CM

## 2025-05-07 PROCEDURE — 93975 VASCULAR STUDY: CPT

## 2025-05-08 DIAGNOSIS — N18.31 HYPERTENSIVE KIDNEY DISEASE WITH STAGE 3A CHRONIC KIDNEY DISEASE (HCC): ICD-10-CM

## 2025-05-08 DIAGNOSIS — I12.9 HYPERTENSIVE KIDNEY DISEASE WITH STAGE 3A CHRONIC KIDNEY DISEASE (HCC): ICD-10-CM

## 2025-05-08 PROCEDURE — 93975 VASCULAR STUDY: CPT | Performed by: SURGERY

## 2025-05-09 ENCOUNTER — RESULTS FOLLOW-UP (OUTPATIENT)
Age: 80
End: 2025-05-09

## 2025-05-09 DIAGNOSIS — I12.9 HYPERTENSIVE KIDNEY DISEASE WITH STAGE 3A CHRONIC KIDNEY DISEASE (HCC): ICD-10-CM

## 2025-05-09 DIAGNOSIS — N18.31 HYPERTENSIVE KIDNEY DISEASE WITH STAGE 3A CHRONIC KIDNEY DISEASE (HCC): ICD-10-CM

## 2025-05-09 DIAGNOSIS — I10 ESSENTIAL HYPERTENSION: ICD-10-CM

## 2025-05-09 RX ORDER — VERAPAMIL HYDROCHLORIDE 240 MG/1
240 CAPSULE, DELAYED RELEASE ORAL DAILY
Qty: 90 CAPSULE | Refills: 3 | Status: SHIPPED | OUTPATIENT
Start: 2025-05-09

## 2025-05-09 RX ORDER — IRBESARTAN 150 MG/1
150 TABLET ORAL DAILY
Qty: 90 TABLET | Refills: 3 | Status: SHIPPED | OUTPATIENT
Start: 2025-05-09

## 2025-05-09 RX ORDER — METOPROLOL SUCCINATE 100 MG/1
100 TABLET, EXTENDED RELEASE ORAL DAILY
Qty: 90 TABLET | Refills: 3 | Status: SHIPPED | OUTPATIENT
Start: 2025-05-09

## 2025-05-09 RX ORDER — IRBESARTAN 150 MG/1
150 TABLET ORAL DAILY
Qty: 90 TABLET | Refills: 1 | Status: SHIPPED | OUTPATIENT
Start: 2025-05-09 | End: 2025-05-09 | Stop reason: SDUPTHER

## 2025-05-09 RX ORDER — HYDROCHLOROTHIAZIDE 25 MG/1
25 TABLET ORAL DAILY
Qty: 90 TABLET | Refills: 3 | Status: SHIPPED | OUTPATIENT
Start: 2025-05-09

## 2025-06-10 ENCOUNTER — CONSULT (OUTPATIENT)
Dept: CARDIOLOGY CLINIC | Facility: CLINIC | Age: 80
End: 2025-06-10
Payer: MEDICARE

## 2025-06-10 VITALS
HEIGHT: 64 IN | SYSTOLIC BLOOD PRESSURE: 188 MMHG | BODY MASS INDEX: 29.53 KG/M2 | TEMPERATURE: 97.2 F | OXYGEN SATURATION: 97 % | DIASTOLIC BLOOD PRESSURE: 92 MMHG | WEIGHT: 173 LBS | HEART RATE: 59 BPM

## 2025-06-10 DIAGNOSIS — I10 ESSENTIAL HYPERTENSION: Primary | ICD-10-CM

## 2025-06-10 DIAGNOSIS — I51.7 LEFT VENTRICULAR HYPERTROPHY: ICD-10-CM

## 2025-06-10 PROCEDURE — 99204 OFFICE O/P NEW MOD 45 MIN: CPT | Performed by: INTERNAL MEDICINE

## 2025-06-10 PROCEDURE — 93000 ELECTROCARDIOGRAM COMPLETE: CPT | Performed by: INTERNAL MEDICINE

## 2025-06-10 RX ORDER — AMLODIPINE BESYLATE 5 MG/1
5 TABLET ORAL DAILY
Qty: 90 TABLET | Refills: 3 | Status: SHIPPED | OUTPATIENT
Start: 2025-06-10 | End: 2025-06-10

## 2025-06-10 RX ORDER — AMLODIPINE BESYLATE 10 MG/1
10 TABLET ORAL DAILY
Qty: 90 TABLET | Refills: 3 | Status: SHIPPED | OUTPATIENT
Start: 2025-06-10

## 2025-06-10 NOTE — PROGRESS NOTES
[facility]  [address]  [address]  [place] [id number] [phone]    Patient Name: Amanda Troncoso  YOB: 1945 ;female  MR No: [id number]        Diagnosis ICD-10-CM Associated Orders   1. Essential hypertension  I10 POCT ECG     amLODIPine (NORVASC) 5 mg tablet      2. Left ventricular hypertrophy  I51.7            Assessment and recommendations:    1. Essential hypertension  -     POCT ECG  -     amLODIPine (NORVASC) 5 mg tablet; Take 1 tablet (5 mg total) by mouth daily  2. Left ventricular hypertrophy     Patient admits that she has never had good blood pressure control.  She has already seen nephrology recently and her meds were adjusted.  She seems to have developed a's induced cough and was switched to ARB.  I agree that it would not be wise to keep her on high-dose metoprolol and verapamil.  I have advised her to discontinue the verapamil and she can start amlodipine 10 mg daily and further adjustment in her ARB dose can be done when she visits nephrology for a follow-up visit.  Most recent echocardiogram from April 2025 showed moderate concentric left ventricle hypertrophy with normal systolic function.  Interestingly, diastolic dysfunction was only mildly abnormal.  Mild AI and mild MR was noted as well.  No evidence of MVP.  Recent labs from April 2025 were reviewed and showed normal electrolytes and good kidney function.  LDL has been mildly elevated for several years now.  However I would not recommend starting her on statins at this time.    CHIEF COMPLAINT:      Hypertension, LVH    HPI:   80-year-old female with past medical history significant for hypertension, moderate left ventricular hypertrophy who presents for her first visit to [facility] cardiology.  Patient had previously been seeing [place] [facility] for her hypertension.  She is doing quite well considering her age and remains quite active and plays tennis and  does yard work and walks her dog every day.  She denies any exertional chest pain, palpitation or syncope.  She does admit to chronic dyspnea on exertion.    Past Medical History[1]       CURRENT  MEDICATIONS:    Current Medications[2]    ALLERGIES  Allergies   Allergen Reactions    Iodinated Contrast Media Other (See Comments) and Hives    Yellow Dye - Food Allergy Other (See Comments)       Lab Results   Component Value Date    LDLCALC 138 (H) 04/04/2025    LDLCALC 127 (H) 09/26/2024    HDL 45 (L) 04/04/2025    HDL 50 09/26/2024    CHOLESTEROL 206 (H) 04/04/2025    CHOLESTEROL 199 09/26/2024    TRIG 116 04/04/2025    TRIG 112 09/26/2024    CREATININE 1.02 04/04/2025    CREATININE 1.04 09/26/2024    K 4.2 04/04/2025    K 4.4 09/26/2024    SODIUM 140 04/04/2025    SODIUM 141 09/26/2024       I have personally reviewed the most recent ECG from today which showed normal sinus rhythm at 60 beats a minute with minimal voltage criteria for LVH and nonspecific ST abnormality.      REVIEW OF SYSTEMS   Positive for: Hearing loss, arthritis  Negative for: All remaining as reviewed below and in HPI.    SYSTEM SYMPTOMS REVIEWED:  General--weight change, fever, night sweats  Respiratory--cough, wheezing, shortness of breath, sputum production  Cardiovascular--chest pain, syncope, dyspnea on exertion, edema, decline in exercise tolerance, claudication   Gastrointestinal--persistent vomiting, diarrhea, abdominal distention, blood in stool   Muscular or skeletal--joint pain or swelling   Neurologic--headaches, syncope, abnormal movement  Hematologic--history of easy bruising and bleeding   Endocrine--thyroid enlargement, heat or cold intolerance, polyuria   Psychiatric--anxiety, depression     General physical examination:    General appearance: Alert, no acute distress, appears younger than stated age.  Mildly overweight  HEENT: Mucous membranes are moist.  No obvious abnormality noted.  Neck: Supple with no lymphadenopathy.  No  "JVD.  Carotid pulses are intact.  No carotid bruit.  Cardiovascular system: Regular rhythm.  Normal S1 and S2.  No murmurs.  No rubs or gallops. Extremities: Mild venous varicosities in both lower extremities.  No edema. No cyanosis.  Pulmonary: Respirations unlabored.  Good air entry bilaterally.  Clear to auscultation bilaterally.  Gastrointestinal: Abdomen is soft and nontender.  Bowel sounds are positive.  Musculoskeletal: Upper Extremities: Normal upper motor strength. Lower Extremity: Normal motor strength. Gait: Normal.   Skin: Skin is warm. No rashes or lesions.  Neurological: Patient is alert and oriented with no gross motor deficits.  Psychiatric: Mood is normal.  Behavior is normal.    Vitals:    06/10/25 1111   BP: (!) 188/92   Pulse: 59   Temp: (!) 97.2 °F (36.2 °C)   SpO2: 97%      Body mass index is 29.7 kg/m².  Wt Readings from Last 3 Encounters:   06/10/25 78.5 kg (173 lb)   04/15/25 77.6 kg (171 lb)   04/12/25 77.6 kg (171 lb)             Awais Wiggins MD, [facility], [facility]    Portions of the record  have been created with voice recognition software.  Occasional grammatical mistakes or wrong word or \"sound alike\" substitutions may have occurred due to the inherent limitations of voice recognition software. Please reach out to me directly for any clarifications.          [1]   Past Medical History:  Diagnosis Date    Hypertension     Vertigo    [2]   Current Outpatient Medications:     amLODIPine (NORVASC) 5 mg tablet, Take 1 tablet (5 mg total) by mouth daily, Disp: 90 tablet, Rfl: 3    Ascorbic Acid (DUARTE-C PO), Take by mouth, Disp: , Rfl:     cholecalciferol (VITAMIN D3) 1,000 units tablet, Take 5,000 Units by mouth in the morning., Disp: , Rfl:     hydroCHLOROthiazide 25 mg tablet, Take 1 tablet (25 mg total) by mouth daily, Disp: 90 tablet, Rfl: 3    irbesartan (AVAPRO) 150 mg tablet, Take 1 tablet (150 mg total) by mouth daily, Disp: 90 tablet, Rfl: 3    LORazepam (ATIVAN) 0.5 mg tablet, Take 1 tablet " (0.5 mg total) by mouth if needed for anxiety, Disp: , Rfl:     metoprolol succinate (TOPROL-XL) 100 mg 24 hr tablet, Take 1 tablet (100 mg total) by mouth daily, Disp: 90 tablet, Rfl: 3

## 2025-06-26 ENCOUNTER — OFFICE VISIT (OUTPATIENT)
Dept: FAMILY MEDICINE CLINIC | Facility: CLINIC | Age: 80
End: 2025-06-26
Payer: MEDICARE

## 2025-06-26 VITALS
HEART RATE: 64 BPM | BODY MASS INDEX: 29.4 KG/M2 | OXYGEN SATURATION: 97 % | SYSTOLIC BLOOD PRESSURE: 163 MMHG | WEIGHT: 171.3 LBS | DIASTOLIC BLOOD PRESSURE: 77 MMHG

## 2025-06-26 DIAGNOSIS — K82.4 GALLBLADDER POLYP: ICD-10-CM

## 2025-06-26 DIAGNOSIS — K86.2 PANCREATIC CYST: ICD-10-CM

## 2025-06-26 DIAGNOSIS — K76.89 HEPATIC CYST: ICD-10-CM

## 2025-06-26 DIAGNOSIS — K59.09 CHRONIC CONSTIPATION: ICD-10-CM

## 2025-06-26 DIAGNOSIS — G89.29 CHRONIC MIDLINE LOW BACK PAIN WITHOUT SCIATICA: ICD-10-CM

## 2025-06-26 DIAGNOSIS — I42.2 HYPERTROPHIC CARDIOMYOPATHY (HCC): ICD-10-CM

## 2025-06-26 DIAGNOSIS — M54.50 CHRONIC MIDLINE LOW BACK PAIN WITHOUT SCIATICA: ICD-10-CM

## 2025-06-26 DIAGNOSIS — N18.31 STAGE 3A CHRONIC KIDNEY DISEASE (HCC): ICD-10-CM

## 2025-06-26 DIAGNOSIS — H91.93 BILATERAL HEARING LOSS, UNSPECIFIED HEARING LOSS TYPE: ICD-10-CM

## 2025-06-26 DIAGNOSIS — I51.7 LEFT VENTRICULAR HYPERTROPHY: Primary | ICD-10-CM

## 2025-06-26 DIAGNOSIS — E55.9 VITAMIN D DEFICIENCY: ICD-10-CM

## 2025-06-26 DIAGNOSIS — M85.80 OSTEOPENIA AFTER MENOPAUSE: ICD-10-CM

## 2025-06-26 DIAGNOSIS — M70.62 TROCHANTERIC BURSITIS OF LEFT HIP: ICD-10-CM

## 2025-06-26 DIAGNOSIS — I10 ESSENTIAL HYPERTENSION: ICD-10-CM

## 2025-06-26 DIAGNOSIS — Z78.0 OSTEOPENIA AFTER MENOPAUSE: ICD-10-CM

## 2025-06-26 DIAGNOSIS — E78.2 MIXED HYPERLIPIDEMIA: ICD-10-CM

## 2025-06-26 PROCEDURE — G2211 COMPLEX E/M VISIT ADD ON: HCPCS | Performed by: FAMILY MEDICINE

## 2025-06-26 PROCEDURE — 99214 OFFICE O/P EST MOD 30 MIN: CPT | Performed by: FAMILY MEDICINE

## 2025-06-26 NOTE — ASSESSMENT & PLAN NOTE
Chronic  Current Blood Pressure: 163/77  Home Bps 130-150/70's  Follows with Cardiology and nephrology at  now  Current meds: HCTZ 25mg daily, toprol XL 100mg daily, irbesartan 150mg daily, amlodipine 10mg daily

## 2025-06-26 NOTE — ASSESSMENT & PLAN NOTE
Chronic, stable  Continue daily fiber supplementation, miralax daily   Continue to follow with GI as scheduled - [facility] [place]   Continue to monitor for acute worsening of symptoms

## 2025-06-26 NOTE — PROGRESS NOTES
"Name: Amanda Troncoso      : 1945      MRN: [id number]  Encounter Provider: Sri Lopes DO  Encounter Date: 2025   Encounter department: [facility]  :  Assessment & Plan  Left ventricular hypertrophy  Melinda is asymptomatic with respect to the hypertrophy. Will continue to monitor. Follows with cardiology for monitoring Echos. Reviewed recent cardiology note.              Hypertrophic cardiomyopathy (HCC)  Chronic, follows with cardiology - needs referral for new provider  Echocardiogram 2022: \"moderate concentric left ventricular hypertrophy with ejection fraction of 80% with indeterminate diastolic parameters, minimal trace aortic regurgitation, trace to mild mitral regurgitation, trace tricuspid regurgitation with normal pulmonary pressure estimate. Overall, no significant change.\"   Echo 25: Left ventricular cavity size is normal. There is moderate concentric hypertrophy. The left ventricular ejection fraction is 65-69 %. Systolic function is normal. Wall motion is normal. Diastolic function is mildly abnormal, consistent with grade I (abnormal) relaxation. Mild MR, AR.     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 Had recent MRI from gen surg - awaiting recommendations - appears stable and likely benign. Patient reports she " "declines any surgical intervention             Hepatic cyst  Reviewed Surgical Oncology office visit 9/22/24:  Benigng liver cyst that is asymptomatic, continue to monitor for signs of symptoms which would indicate need for resectoin; Liver hemangioma, Asymptomatic, it is not exophytic, and less than 10 cm's. Recommend to continue to monitor for symptoms and imaging for this on as needed basis     Patient remains asymptomatic   Monitor CMP q6-12 mo, Monitor RUQ US annually, repeat 8/2025  Continue to monitor for development of symptoms             Gallbladder polyp  Reviewed surgical oncology note from 6/2023: \"By US of 10/14/2022 she has multiple GB polyps, the largest 6 mm. This was not confirmed by MRI of 02/09/2023. If she does have gallbladder polyps, they have a chance to develop into cancer.  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 Gallbladder polyps warranting right upper quadrant sonogram on a nonemergent, outpatient basis.  US completed: There are a few gallbladder polyps, ranging in size up to 6 mm.   Gen Surg note from 9/22/24 reviewed: recommendation is for laparoscopic cholecystectomy due to the polyps being multiple, patient would like to continue observation which is reasonable as they have stayed the same size, will refer back to GI DDA for continued surveilance     At this time, patient remains asymptomatic. Continue to monitor for symptom development.              Chronic constipation  Chronic, stable  Continue daily fiber supplementation, miralax daily   Continue to follow with GI as scheduled - [facility] in [place]   Continue to monitor for acute worsening of symptoms             Bilateral hearing loss, unspecified hearing loss type  Has hearing aids b/l. Followed by ENT             Trochanteric bursitis of left hip  No longer taking celebrex, continue just prn NSAIDs OTC              Osteopenia after " menopause  Dexa 4/9/2024 - osteopenia    Continue vit D and Ca supplementation  Continue healthy lifestyle habits  Monitor dexa q2 years, will plan for repeat dexa 4/2026             Stage 3a chronic kidney disease (HCC)  Lab Results   Component Value Date    EGFR 51 04/04/2025    EGFR 50 09/26/2024    EGFR 50 09/12/2023    CREATININE 1.02 04/04/2025    CREATININE 1.04 09/26/2024    CREATININE 1.04 09/12/2023     Chronic, stable  Continue mgmt HTN as above  Continue to monitor with labs             Vitamin D deficiency  History of low vitamin D >5 years ago  Recent labs have shown improvement with levels in 40's   Continue vitamin D supplementation daily 1000u  Mgmt of osteopenia as above  Monitor Vit D labs q12 months.              Mixed hyperlipidemia  Lab Results   Component Value Date    CHOLESTEROL 206 (H) 04/04/2025    TRIG 116 04/04/2025    HDL 45 (L) 04/04/2025    LDLCALC 138 (H) 04/04/2025     Overall, has been lifestyle controlled  Recommend continue with healthy lifestyle habits  Monitor lipid panel              Chronic midline low back pain without sciatica  Recommend PT     Orders:    Ambulatory Referral to Physical Therapy; Future      Return in about 3 months (around 9/26/2025) for Follow up chronic conditions .       History of Present Illness   Chief Complaint   Patient presents with    Follow-up       HPI  Patient presents for follow up. She has since est with  cardiology and nephrology. Visit notes reviewed and changes as follows:  Nephro - ramipril switched to irbesartan 150mg daily  HCTZ 25mg daily, Toprol 100mg daily,   Card said no statin - verap d/c, amlodipine now 10mg daily    Has upcoming nephro appt in August to consider increase in irbesartan     Home Bp 130-150's/70's for the past 2 weeks. Does seem to be improving with current regimen       Review of Systems   Constitutional:  Negative for appetite change, chills, diaphoresis, fever and unexpected weight change.   Eyes:  Negative for  visual disturbance.   Respiratory:  Negative for shortness of breath.    Cardiovascular:  Negative for chest pain and leg swelling.   Gastrointestinal:  Negative for constipation and diarrhea.   Endocrine: Negative for polydipsia and polyuria.   Genitourinary:  Negative for frequency.   Musculoskeletal:  Positive for arthralgias.   Neurological:  Negative for dizziness, light-headedness and headaches.       Objective   /77 (BP Location: Right arm, Patient Position: Sitting, Cuff Size: Standard)   Pulse 64   Wt 77.7 kg (171 lb 4.8 oz)   SpO2 97%   BMI 29.40 kg/m²      Physical Exam  Vitals reviewed.   Constitutional:       General: She is not in acute distress.     Appearance: She is well-developed and normal weight. She is not ill-appearing.   HENT:      Head: Normocephalic and atraumatic.      Right Ear: External ear normal.      Left Ear: External ear normal.      Nose: Nose normal.     Eyes:      Extraocular Movements: Extraocular movements intact.      Conjunctiva/sclera: Conjunctivae normal.     Neck:      Vascular: No JVD.     Cardiovascular:      Rate and Rhythm: Normal rate.   Pulmonary:      Effort: Pulmonary effort is normal.   Abdominal:      General: Abdomen is flat.      Palpations: Abdomen is soft.     Musculoskeletal:      Cervical back: Neck supple.      Right lower leg: No edema.      Left lower leg: No edema.     Neurological:      General: No focal deficit present.      Mental Status: She is alert.     Psychiatric:         Mood and Affect: Mood normal.         Behavior: Behavior normal.

## 2025-06-26 NOTE — ASSESSMENT & PLAN NOTE
Lab Results   Component Value Date    EGFR 51 04/04/2025    EGFR 50 09/26/2024    EGFR 50 09/12/2023    CREATININE 1.02 04/04/2025    CREATININE 1.04 09/26/2024    CREATININE 1.04 09/12/2023     Chronic, stable  Continue mgmt HTN as above  Continue to monitor with labs

## 2025-06-26 NOTE — ASSESSMENT & PLAN NOTE
"Chronic, follows with cardiology - needs referral for new provider  Echocardiogram 6/14/2022: \"moderate concentric left ventricular hypertrophy with ejection fraction of 80% with indeterminate diastolic parameters, minimal trace aortic regurgitation, trace to mild mitral regurgitation, trace tricuspid regurgitation with normal pulmonary pressure estimate. Overall, no significant change.\"   Echo 4/2/25: Left ventricular cavity size is normal. There is moderate concentric hypertrophy. The left ventricular ejection fraction is 65-69 %. Systolic function is normal. Wall motion is normal. Diastolic function is mildly abnormal, consistent with grade I (abnormal) relaxation. Mild MR, AR.     Plan: continue toprol XL 100mg daily, amlodipine 10mg daily   Continue to monitor BMP, Lipid              "

## 2025-06-26 NOTE — ASSESSMENT & PLAN NOTE
Melinda is asymptomatic with respect to the hypertrophy. Will continue to monitor. Follows with cardiology for monitoring Echos. Reviewed recent cardiology note.

## 2025-06-26 NOTE — ASSESSMENT & PLAN NOTE
Lab Results   Component Value Date    CHOLESTEROL 206 (H) 04/04/2025    TRIG 116 04/04/2025    HDL 45 (L) 04/04/2025    LDLCALC 138 (H) 04/04/2025     Overall, has been lifestyle controlled  Recommend continue with healthy lifestyle habits  Monitor lipid panel

## 2025-07-01 NOTE — PROGRESS NOTES
PT Evaluation     Today's date: 2025  Patient name: Amanda Troncoso  : 1945  MRN: [id number]  Referring provider: Sri Lopes DO  Dx:   Encounter Diagnosis     ICD-10-CM    1. Chronic midline low back pain without sciatica  M54.50     G89.29                      Assessment  Impairments: abnormal gait, abnormal or restricted ROM, activity intolerance, impaired physical strength, lacks appropriate home exercise program, pain with function, poor posture , unable to perform ADL and activity limitations    Assessment details: PT notes the patient with decrease ROM and strength t/o the lumbar spine as well as the bilateral hip and LE with trunk/core weakness leading to increase pain levels and functional limitations with need for course of skilled therapy for 6 weeks with focus on lumbar stabilization, manual therapy, posture, analgesic modalities, and HEP.     Understanding of Dx/Px/POC: good     Prognosis: good    Goals  ST.  Initiate HEP  2.  Decrease symptoms by 25-50%   3.  Increase ROM by 25-50%   4.  Increase strength by 1-2 mm grades  LT.  Improve spinal stability with increase trunk/core strength   2.  Decrease limitations with standing and walking  3.  Decrease limitations with transfers and ADL  4.  DC with HEP     Plan  Patient would benefit from: PT eval and skilled physical therapy  Planned modality interventions: thermotherapy: hydrocollator packs    Planned therapy interventions: manual therapy, neuromuscular re-education, postural training, patient/caregiver education, self care, strengthening, stretching, therapeutic exercise, flexibility, graded exercise and home exercise program    Frequency: 2x week  Duration in weeks: 6  Treatment plan discussed with: patient  Plan details: PT notes review of POC and findings with patient who is in agreement with PT recommendations of course of skilled therapy.        Subjective Evaluation    History of Present Illness  Mechanism of  injury: Patient reports of LBP about 2 weeks with waking in the morning and no TAMIKA for the cause of symptoms.  Patient states the first few steps and trunk movements in the morning are difficult and painful.  Patient reports after a few steps or trunk movements the symptoms calm down but patient states difficulty with ADL especially push/pull and lifting activities.  Patient spoke with PCP during yearly f/u about the LB issues and was referred to OPPT for to address LB symptoms.  Patient is retired with significant PMH of skin cancer, HTN, CHF, CKD and right TKA.    Patient Goals  Patient goals for therapy: decreased pain, increased motion, increased strength, independence with ADLs/IADLs and return to sport/leisure activities    Pain  Current pain ratin  At best pain ratin  At worst pain ratin  Quality: tight, pulling, dull ache, discomfort and cramping  Relieving factors: change in position and rest  Aggravating factors: lifting, stair climbing, walking and standing    Treatments  Current treatment: physical therapy        Objective     Postural Observations  Seated posture: fair  Standing posture: fair      Palpation   Left   Tenderness of the TFL.     Right   Muscle spasm in the erector spinae, lumbar paraspinals and quadratus lumborum.   Tenderness of the erector spinae, lumbar paraspinals, quadratus lumborum and TFL.     Tenderness     Lumbar Spine  Tenderness in the spinous process.     Left Hip   Tenderness in the PSIS. No tenderness in the greater trochanter, iliac crest and sacroiliac joint.     Right Hip   Tenderness in the PSIS, greater trochanter and iliac crest. No tenderness in the sacroiliac joint.     Neurological Testing     Reflexes   Left   Patellar (L4): trace (1+)  Achilles (S1): trace (1+)    Right   Patellar (L4): trace (1+)  Achilles (S1): trace (1+)    Active Range of Motion     Lumbar   Flexion:  WFL  Extension:  with pain Restriction level: moderate  Left lateral flexion:   with pain Restriction level: minimal  Right lateral flexion:  with pain Restriction level: minimal  Left rotation:  with pain Restriction level: minimal  Right rotation:  with pain Restriction level: minimal  Left Hip   Flexion: 74 degrees   Extension: 5 degrees   Abduction: WFL  External rotation (90/90): WFL  Internal rotation (90/90): WFL    Right Hip   Flexion: 62 degrees   Extension: 4 degrees   Abduction: WFL  External rotation (90/90): 27 degrees   Internal rotation (90/90): 13 degrees   Left Knee   Normal active range of motion    Right Knee   Normal active range of motion    Additional Active Range of Motion Details  Trunk/core weakness with abdominals of 3+/5 and lumbar PS of 2/5     Passive Range of Motion   Left Hip   Flexion: 76 degrees   Extension: 5 degrees     Right Hip   Flexion: 65 degrees   Extension: 5 degrees   External rotation (90/90): 28 degrees   Internal rotation (90/90): WFL    Strength/Myotome Testing     Left Hip   Planes of Motion   Flexion: 4+  Extension: 5  Abduction: 5  Adduction: 5  External rotation: 5  Internal rotation: 4+    Right Hip   Planes of Motion   Flexion: 4  Extension: 5  Abduction: 4+  Adduction: 5  External rotation: 4  Internal rotation: 4+    Left Knee   Normal strength    Right Knee   Normal strength    Tests     Lumbar   Negative SIJ compression.     Left   Negative femoral stretch and passive SLR.     Right   Negative femoral stretch and passive SLR.     Left Hip   Negative MARELY, FADIR, long sit and Saravanan.   Pino: Negative.   Modified Pino: Negative.   90/90 SLR: Negative.   SLR: Negative.     Right Hip   Positive Saravanan.   Negative MARELY, FADIR and long sit.   Pino: Negative.    Modified Pino: Negative.   90/90 SLR: Negative.  SLR: Negative.              Precautions:  PMH Skin Cancer, HTN, CHF, CKD, and right TKA  POC 8/2/25       Manuals 7/2       Bilateral HS, quad, piriformis, hip ABD, and gastroc with hip and lumbar traction                                  Neuro Re-Ed        Wall Lumbar Extension         Stand OAL         TB Trunk Rotation         Bridge and with ABD         Supine Tball ABD Crunch         Seated Tball Trunk Rotation and PNF                 Ther Ex        SRC for ROM and Strength         Supine Tball LTR         Supine SKTC         Lunge Walk         Side Step and Squat         Push/Pull Cart                 HEP update/review  15 min        Ther Activity                        Gait Training                        Modalities        MHP PRN

## 2025-07-02 ENCOUNTER — EVALUATION (OUTPATIENT)
Dept: PHYSICAL THERAPY | Facility: CLINIC | Age: 80
End: 2025-07-02
Attending: FAMILY MEDICINE
Payer: MEDICARE

## 2025-07-02 DIAGNOSIS — M54.50 CHRONIC MIDLINE LOW BACK PAIN WITHOUT SCIATICA: Primary | ICD-10-CM

## 2025-07-02 DIAGNOSIS — G89.29 CHRONIC MIDLINE LOW BACK PAIN WITHOUT SCIATICA: Primary | ICD-10-CM

## 2025-07-02 PROCEDURE — 97161 PT EVAL LOW COMPLEX 20 MIN: CPT | Performed by: PHYSICAL THERAPIST

## 2025-07-02 PROCEDURE — 97535 SELF CARE MNGMENT TRAINING: CPT | Performed by: PHYSICAL THERAPIST

## 2025-07-07 NOTE — PROGRESS NOTES
"Daily Note     Today's date: 2025  Patient name: Amanda Troncoso  : 1945  MRN: [id number]  Referring provider: Sri Lopes DO  Dx:   Encounter Diagnosis     ICD-10-CM    1. Chronic midline low back pain without sciatica  M54.50     G89.29           Start Time: 1000  Stop Time: 1050  Total time in clinic (min): 50 minutes    Subjective: Patient reports she has not had the excruciating pain in her B quads in the past two days.      Objective: See treatment diary below      Assessment: Tolerated treatment well. PT initiated POC with patient with focus on lumbar mobility and deep core activation. Patient was unable to complete certain interventions due to recreation of muscle cramping but overall did well with TE. Patient demonstrated fatigue post treatment, exhibited good technique with therapeutic exercises, and would benefit from continued PT to improve core strength and lumbar mobility to reduce functional impairments.       Plan: Continue per plan of care.      Precautions:  PMH Skin Cancer, HTN, CHF, CKD, and right TKA  POC 25       Manuals       Bilateral HS, quad, piriformis, hip ABD, and gastroc with hip and lumbar traction   15'      Lumbar STM  10'                      Neuro Re-Ed        Wall Lumbar Extension         Stand OAL         TB Trunk Rotation         Bridge and with ABD   No ABD 2x10      Supine Tball ABD Crunch   10x3\" hold       Seated Tball Trunk Rotation and PNF                 Ther Ex        SRC for ROM and Strength   10' L1      Supine Tball LTR   10x ea bilat       Supine SKTC         Lunge Walk         Side Step and Squat         Push/Pull Cart                 HEP update/review  15 min        Ther Activity                        Gait Training                        Modalities        MHP PRN  declined                   "

## 2025-07-08 ENCOUNTER — OFFICE VISIT (OUTPATIENT)
Dept: PHYSICAL THERAPY | Facility: CLINIC | Age: 80
End: 2025-07-08
Attending: FAMILY MEDICINE
Payer: MEDICARE

## 2025-07-08 DIAGNOSIS — G89.29 CHRONIC MIDLINE LOW BACK PAIN WITHOUT SCIATICA: Primary | ICD-10-CM

## 2025-07-08 DIAGNOSIS — M54.50 CHRONIC MIDLINE LOW BACK PAIN WITHOUT SCIATICA: Primary | ICD-10-CM

## 2025-07-08 PROCEDURE — 97110 THERAPEUTIC EXERCISES: CPT

## 2025-07-08 PROCEDURE — 97140 MANUAL THERAPY 1/> REGIONS: CPT

## 2025-07-10 NOTE — PROGRESS NOTES
"Daily Note     Today's date: 2025  Patient name: Amanda Troncoso  : 1945  MRN: [id number]  Referring provider: Sri Lopes DO  Dx:   Encounter Diagnosis     ICD-10-CM    1. Chronic midline low back pain without sciatica  M54.50     G89.29                      Subjective: Patient states that she has low back pain in the morning that gets better as the day goes on.       Objective: See treatment diary below      Assessment: Tolerated treatment well and progression of treatment well with no increased pain noted during exercises. Patient demonstrated fatigue post treatment, exhibited good technique with therapeutic exercises, and would benefit from continued PT to address ongoing impairments and improve functional mobility.       Plan: Continue per plan of care.      Precautions:  PMH Skin Cancer, HTN, CHF, CKD, and right TKA  POC 25       Manuals      Bilateral HS, quad, piriformis, hip ABD, and gastroc with hip and lumbar traction   15' 15'     Lumbar STM  10' 10'                     Neuro Re-Ed        Wall Lumbar Extension         Stand OAL         TB Trunk Rotation         Bridge and with ABD   No ABD 2x10 No ABD 2x10     Supine Tball ABD Crunch   10x3\" hold  10x3\" hold     Seated Tball Trunk Rotation and PNF    10x bilat             Ther Ex        SRC for ROM and Strength   10' L1 10' L1     Supine Tball LTR   10x ea bilat  10x5\"      Supine SKTC         Lunge Walk         Side Step and Squat         Push/Pull Cart                 HEP update/review  15 min        Ther Activity                        Gait Training                        Modalities        MHP PRN  declined                     "

## 2025-07-11 ENCOUNTER — OFFICE VISIT (OUTPATIENT)
Dept: PHYSICAL THERAPY | Facility: CLINIC | Age: 80
End: 2025-07-11
Attending: FAMILY MEDICINE
Payer: MEDICARE

## 2025-07-11 DIAGNOSIS — G89.29 CHRONIC MIDLINE LOW BACK PAIN WITHOUT SCIATICA: Primary | ICD-10-CM

## 2025-07-11 DIAGNOSIS — M54.50 CHRONIC MIDLINE LOW BACK PAIN WITHOUT SCIATICA: Primary | ICD-10-CM

## 2025-07-11 PROCEDURE — 97140 MANUAL THERAPY 1/> REGIONS: CPT

## 2025-07-11 PROCEDURE — 97110 THERAPEUTIC EXERCISES: CPT

## 2025-07-14 ENCOUNTER — OFFICE VISIT (OUTPATIENT)
Dept: PHYSICAL THERAPY | Facility: CLINIC | Age: 80
End: 2025-07-14
Attending: FAMILY MEDICINE
Payer: MEDICARE

## 2025-07-14 DIAGNOSIS — G89.29 CHRONIC MIDLINE LOW BACK PAIN WITHOUT SCIATICA: Primary | ICD-10-CM

## 2025-07-14 DIAGNOSIS — M54.50 CHRONIC MIDLINE LOW BACK PAIN WITHOUT SCIATICA: Primary | ICD-10-CM

## 2025-07-14 PROCEDURE — 97140 MANUAL THERAPY 1/> REGIONS: CPT | Performed by: PHYSICAL THERAPIST

## 2025-07-14 PROCEDURE — 97112 NEUROMUSCULAR REEDUCATION: CPT | Performed by: PHYSICAL THERAPIST

## 2025-07-14 PROCEDURE — 97110 THERAPEUTIC EXERCISES: CPT | Performed by: PHYSICAL THERAPIST

## 2025-07-14 NOTE — PROGRESS NOTES
"Daily Note     Today's date: 2025  Patient name: Amanda Troncoso  : 1945  MRN: [id number]  Referring provider: Sri Lopes DO  Dx:   Encounter Diagnosis     ICD-10-CM    1. Chronic midline low back pain without sciatica  M54.50     G89.29                      Subjective:  Patient reports the overall intensity of the LB symptoms has decreased since the start of therapy.  Patient reports decrease symptoms with waking in the morning and while playing tennis.       Objective: See treatment diary below      Assessment: Tolerated treatment well.  PT notes continuation of decrease ROM and strength t/o the lumbar spine as well as the bilateral hip and LE with trunk/core weakness with need for continuation of skilled therapy.       Plan: Continue per plan of care.      Precautions:  PMH Skin Cancer, HTN, CHF, CKD, and right TKA  POC 25       Manuals     Bilateral HS, quad, piriformis, hip ABD, and gastroc with hip and lumbar traction   15' 15' 15 min     Lumbar PA Mobs   10' 10' 5 min                     Neuro Re-Ed        Wall Lumbar Extension     10x3\" Exhale     Stand OAL     10x Bilat     TB Trunk Rotation         Bridge and with ABD   No ABD 2x10 No ABD 2x10 2x10 Blue     Supine Tball ABD Crunch   10x3\" hold  10x3\" hold 15x3\" Hold     Seated Tball Trunk Rotation and PNF    10x bilat 10x Each Bilat             Ther Ex        SRC for ROM and Strength   10' L1 10' L1 10 min L3     Supine Tball LTR   10x ea bilat  10x5\"  10x5\" Hold Bilat     Supine SKTC         Lunge Walk         Side Step and Squat         Push/Pull Cart                 HEP update/review  15 min        Ther Activity                        Gait Training                        Modalities        MHP PRN  declined  Declined                      "

## 2025-07-15 ENCOUNTER — APPOINTMENT (OUTPATIENT)
Dept: PHYSICAL THERAPY | Facility: CLINIC | Age: 80
End: 2025-07-15
Attending: FAMILY MEDICINE
Payer: MEDICARE

## 2025-07-15 DIAGNOSIS — G89.29 CHRONIC MIDLINE LOW BACK PAIN WITHOUT SCIATICA: Primary | ICD-10-CM

## 2025-07-15 DIAGNOSIS — M54.50 CHRONIC MIDLINE LOW BACK PAIN WITHOUT SCIATICA: Primary | ICD-10-CM

## 2025-07-17 NOTE — PROGRESS NOTES
"Daily Note     Today's date: 2025  Patient name: Amanda Troncoso  : 1945  MRN: [id number]  Referring provider: Sri Lopes DO  Dx:   Encounter Diagnosis     ICD-10-CM    1. Chronic midline low back pain without sciatica  M54.50     G89.29                      Subjective: \"I woke up this morning and feel stiff as a board.\"      Objective: See treatment diary below      Assessment: Tolerated treatment well.  PT notes continuation of decrease ROM and strength t/o the lumbar spine as well as the bilateral hip and LE with trunk/core weakness with need for continuation of skilled therapy.      Plan: Continue per plan of care.      Precautions:  PMH Skin Cancer, HTN, CHF, CKD, and right TKA  POC 25       Manuals    Bilateral HS, quad, piriformis, hip ABD, and gastroc with hip and lumbar traction   15' 15' 15 min  10'   Lumbar PA Mobs   10' 10' 5 min  5' c STM to L SIJ                   Neuro Re-Ed        Wall Lumbar Extension     10x3\" Exhale  10x3\" exhale   Stand OAL     10x Bilat  10x bilat   TB Trunk Rotation         Bridge and with ABD   No ABD 2x10 No ABD 2x10 2x10 Blue  2x10 Blue   Supine Tball ABD Crunch   10x3\" hold  10x3\" hold 15x3\" Hold  2x10 3\"hold   Seated Tball Trunk Rotation and PNF    10x bilat 10x Each Bilat  10xea bilat           Ther Ex        SRC for ROM and Strength   10' L1 10' L1 10 min L3  10'L3   Supine Tball LTR   10x ea bilat  10x5\"  10x5\" Hold Bilat  10x5\" bilat   Supine SKTC         Lunge Walk         Side Step and Squat         Push/Pull Cart                 HEP update/review  15 min        Ther Activity                        Gait Training                        Modalities        MHP PRN  declined  Declined                        "

## 2025-07-18 ENCOUNTER — OFFICE VISIT (OUTPATIENT)
Dept: PHYSICAL THERAPY | Facility: CLINIC | Age: 80
End: 2025-07-18
Attending: FAMILY MEDICINE
Payer: MEDICARE

## 2025-07-18 DIAGNOSIS — M54.50 CHRONIC MIDLINE LOW BACK PAIN WITHOUT SCIATICA: Primary | ICD-10-CM

## 2025-07-18 DIAGNOSIS — G89.29 CHRONIC MIDLINE LOW BACK PAIN WITHOUT SCIATICA: Primary | ICD-10-CM

## 2025-07-18 PROCEDURE — 97140 MANUAL THERAPY 1/> REGIONS: CPT

## 2025-07-18 PROCEDURE — 97112 NEUROMUSCULAR REEDUCATION: CPT

## 2025-07-18 PROCEDURE — 97110 THERAPEUTIC EXERCISES: CPT

## 2025-07-22 ENCOUNTER — OFFICE VISIT (OUTPATIENT)
Dept: PHYSICAL THERAPY | Facility: CLINIC | Age: 80
End: 2025-07-22
Attending: FAMILY MEDICINE
Payer: MEDICARE

## 2025-07-22 ENCOUNTER — TELEPHONE (OUTPATIENT)
Dept: NEPHROLOGY | Facility: CLINIC | Age: 80
End: 2025-07-22

## 2025-07-22 DIAGNOSIS — G89.29 CHRONIC MIDLINE LOW BACK PAIN WITHOUT SCIATICA: Primary | ICD-10-CM

## 2025-07-22 DIAGNOSIS — M54.50 CHRONIC MIDLINE LOW BACK PAIN WITHOUT SCIATICA: Primary | ICD-10-CM

## 2025-07-22 PROCEDURE — 97110 THERAPEUTIC EXERCISES: CPT

## 2025-07-22 PROCEDURE — 97140 MANUAL THERAPY 1/> REGIONS: CPT

## 2025-07-22 PROCEDURE — 97112 NEUROMUSCULAR REEDUCATION: CPT

## 2025-07-22 NOTE — TELEPHONE ENCOUNTER
I called and spoke with Melinda regarding completing blood/urine lab work prior to upcoming appt on 08/04/2025

## 2025-07-22 NOTE — PROGRESS NOTES
"Daily Note     Today's date: 2025  Patient name: Amanda Troncoso  : 1945  MRN: [id number]  Referring provider: Sri Lopes DO  Dx:   Encounter Diagnosis     ICD-10-CM    1. Chronic midline low back pain without sciatica  M54.50     G89.29                      Subjective: Patient reports that she is doing well this morning.      Objective: See treatment diary below      Assessment: Tolerated treatment well. Patient exhibited good technique with therapeutic exercises and would benefit from continued PT to increase ROM/strength and endurance to improve mobility and reduce functional limitation.  Patient did experience a hot flash while she was performing her exs today.  She reported a brief feeling of nausea.  After a CP and cold water patient was able to resume her program with no further issues.      Plan: Continue per plan of care.      Precautions:  PMH Skin Cancer, HTN, CHF, CKD, and right TKA  POC 25       Manuals    Bilateral HS, quad, piriformis, hip ABD, and gastroc with hip and lumbar traction   15' 15' 15 min  10' 10'    Lumbar PA Mobs   10' 10' 5 min  5' c STM to L SIJ 5'                     Neuro Re-Ed         Wall Lumbar Extension     10x3\" Exhale  10x3\" exhale 10x3\" exhale   Stand OAL     10x Bilat  10x bilat NT   TB Trunk Rotation          Bridge and with ABD   No ABD 2x10 No ABD 2x10 2x10 Blue  2x10 Blue 2x10 Blue   Supine Tball ABD Crunch   10x3\" hold  10x3\" hold 15x3\" Hold  2x10 3\"hold 2x10 3\"hold   Seated Tball Trunk Rotation and PNF    10x bilat 10x Each Bilat  10xea bilat NT            Ther Ex         SRC for ROM and Strength   10' L1 10' L1 10 min L3  10'L3 10'L5   Supine Tball LTR   10x ea bilat  10x5\"  10x5\" Hold Bilat  10x5\" bilat 10x5\" bilat   Supine SKTC          Lunge Walk          Side Step and Squat          Push/Pull Cart                   HEP update/review  15 min         Ther Activity                           Gait Training            "                Modalities         MHP PRN  declined  Declined

## 2025-07-24 NOTE — PROGRESS NOTES
"Daily Note     Today's date: 2025  Patient name: Amanda Troncoso  : 1945  MRN: [id number]  Referring provider: Sri Lopes DO  Dx:   Encounter Diagnosis     ICD-10-CM    1. Chronic midline low back pain without sciatica  M54.50     G89.29                      Subjective: Patient repots to PT today with c/o unsteadiness. Patient states \"My vision feels off and I don't feel like myself\"      Objective: See treatment diary below      Assessment: Due to subjective comments patient was evaluated by supervising PT. Found to have no BPPV symptoms or ocular deficits. /44. Supervising PT recommended holding treatment session today and contacting PCP for notification of symptoms and patient verbalized understanding and agreeance.       Plan: Continue per plan of care.      Precautions:  PMH Skin Cancer, HTN, CHF, CKD, and right TKA  POC 25       Manuals    Bilateral HS, quad, piriformis, hip ABD, and gastroc with hip and lumbar traction  15' 15' 15 min  10' 10'     Lumbar PA Mobs  10' 10' 5 min  5' c STM to L SIJ 5'                      Neuro Re-Ed         Wall Lumbar Extension    10x3\" Exhale  10x3\" exhale 10x3\" exhale    Stand OAL    10x Bilat  10x bilat NT    TB Trunk Rotation          Bridge and with ABD  No ABD 2x10 No ABD 2x10 2x10 Blue  2x10 Blue 2x10 Blue    Supine Tball ABD Crunch  10x3\" hold  10x3\" hold 15x3\" Hold  2x10 3\"hold 2x10 3\"hold    Seated Tball Trunk Rotation and PNF   10x bilat 10x Each Bilat  10xea bilat NT             Ther Ex         SRC for ROM and Strength  10' L1 10' L1 10 min L3  10'L3 10'L5    Supine Tball LTR  10x ea bilat  10x5\"  10x5\" Hold Bilat  10x5\" bilat 10x5\" bilat    Supine SKTC          Lunge Walk          Side Step and Squat          Push/Pull Cart                   HEP update/review          Ther Activity                           Gait Training                           Modalities         MHP declined  Declined              "

## 2025-07-25 ENCOUNTER — OFFICE VISIT (OUTPATIENT)
Dept: PHYSICAL THERAPY | Facility: CLINIC | Age: 80
End: 2025-07-25
Attending: FAMILY MEDICINE
Payer: MEDICARE

## 2025-07-25 DIAGNOSIS — M54.50 CHRONIC MIDLINE LOW BACK PAIN WITHOUT SCIATICA: Primary | ICD-10-CM

## 2025-07-25 DIAGNOSIS — G89.29 CHRONIC MIDLINE LOW BACK PAIN WITHOUT SCIATICA: Primary | ICD-10-CM

## 2025-07-28 NOTE — PROGRESS NOTES
Patient contact the clinic and informed PT that she spoke with MD about the symptoms from last session.  MD ordered the patient to stop therapy and undergo further testing.  PT will comply with MD orders and DC with HEP.

## 2025-07-29 ENCOUNTER — APPOINTMENT (OUTPATIENT)
Dept: PHYSICAL THERAPY | Facility: CLINIC | Age: 80
End: 2025-07-29
Attending: FAMILY MEDICINE
Payer: MEDICARE

## 2025-07-29 DIAGNOSIS — G89.29 CHRONIC MIDLINE LOW BACK PAIN WITHOUT SCIATICA: Primary | ICD-10-CM

## 2025-07-29 DIAGNOSIS — M54.50 CHRONIC MIDLINE LOW BACK PAIN WITHOUT SCIATICA: Primary | ICD-10-CM

## 2025-08-04 ENCOUNTER — APPOINTMENT (OUTPATIENT)
Dept: LAB | Facility: CLINIC | Age: 80
End: 2025-08-04
Payer: MEDICARE

## 2025-08-04 ENCOUNTER — OFFICE VISIT (OUTPATIENT)
Age: 80
End: 2025-08-04
Payer: MEDICARE

## 2025-08-04 VITALS
SYSTOLIC BLOOD PRESSURE: 138 MMHG | WEIGHT: 172.6 LBS | TEMPERATURE: 98.3 F | DIASTOLIC BLOOD PRESSURE: 62 MMHG | HEART RATE: 74 BPM | BODY MASS INDEX: 29.63 KG/M2 | OXYGEN SATURATION: 97 %

## 2025-08-04 DIAGNOSIS — E55.9 VITAMIN D DEFICIENCY: ICD-10-CM

## 2025-08-04 DIAGNOSIS — N18.31 HYPERTENSIVE KIDNEY DISEASE WITH STAGE 3A CHRONIC KIDNEY DISEASE (HCC): Primary | ICD-10-CM

## 2025-08-04 DIAGNOSIS — I51.7 LVH (LEFT VENTRICULAR HYPERTROPHY): ICD-10-CM

## 2025-08-04 DIAGNOSIS — I1A.0 RESISTANT HYPERTENSION: ICD-10-CM

## 2025-08-04 DIAGNOSIS — N18.31 STAGE 3A CHRONIC KIDNEY DISEASE (HCC): ICD-10-CM

## 2025-08-04 DIAGNOSIS — N18.31 HYPERTENSIVE KIDNEY DISEASE WITH STAGE 3A CHRONIC KIDNEY DISEASE (HCC): ICD-10-CM

## 2025-08-04 DIAGNOSIS — I12.9 HYPERTENSIVE KIDNEY DISEASE WITH STAGE 3A CHRONIC KIDNEY DISEASE (HCC): ICD-10-CM

## 2025-08-04 DIAGNOSIS — I12.9 HYPERTENSIVE KIDNEY DISEASE WITH STAGE 3A CHRONIC KIDNEY DISEASE (HCC): Primary | ICD-10-CM

## 2025-08-04 LAB
25(OH)D3 SERPL-MCNC: 47.8 NG/ML (ref 30–100)
ALBUMIN SERPL BCG-MCNC: 4.3 G/DL (ref 3.5–5)
ALP SERPL-CCNC: 84 U/L (ref 34–104)
ALT SERPL W P-5'-P-CCNC: 22 U/L (ref 7–52)
ANION GAP SERPL CALCULATED.3IONS-SCNC: 9 MMOL/L (ref 4–13)
AST SERPL W P-5'-P-CCNC: 21 U/L (ref 13–39)
BACTERIA UR QL AUTO: NORMAL /HPF
BASOPHILS # BLD AUTO: 0.04 THOUSANDS/ÂΜL (ref 0–0.1)
BASOPHILS NFR BLD AUTO: 1 % (ref 0–1)
BILIRUB SERPL-MCNC: 0.66 MG/DL (ref 0.2–1)
BILIRUB UR QL STRIP: NEGATIVE
BUN SERPL-MCNC: 33 MG/DL (ref 5–25)
CALCIUM SERPL-MCNC: 10 MG/DL (ref 8.4–10.2)
CHLORIDE SERPL-SCNC: 102 MMOL/L (ref 96–108)
CLARITY UR: CLEAR
CO2 SERPL-SCNC: 29 MMOL/L (ref 21–32)
COLOR UR: COLORLESS
CREAT SERPL-MCNC: 1.26 MG/DL (ref 0.6–1.3)
CREAT UR-MCNC: 43.2 MG/DL
EOSINOPHIL # BLD AUTO: 0.17 THOUSAND/ÂΜL (ref 0–0.61)
EOSINOPHIL NFR BLD AUTO: 4 % (ref 0–6)
ERYTHROCYTE [DISTWIDTH] IN BLOOD BY AUTOMATED COUNT: 12.9 % (ref 11.6–15.1)
GFR SERPL CREATININE-BSD FRML MDRD: 39 ML/MIN/1.73SQ M
GLUCOSE P FAST SERPL-MCNC: 92 MG/DL (ref 65–99)
GLUCOSE UR STRIP-MCNC: NEGATIVE MG/DL
HCT VFR BLD AUTO: 43.1 % (ref 34.8–46.1)
HGB BLD-MCNC: 14.7 G/DL (ref 11.5–15.4)
HGB UR QL STRIP.AUTO: NEGATIVE
IMM GRANULOCYTES # BLD AUTO: 0.01 THOUSAND/UL (ref 0–0.2)
IMM GRANULOCYTES NFR BLD AUTO: 0 % (ref 0–2)
KETONES UR STRIP-MCNC: NEGATIVE MG/DL
LEUKOCYTE ESTERASE UR QL STRIP: NEGATIVE
LYMPHOCYTES # BLD AUTO: 1.6 THOUSANDS/ÂΜL (ref 0.6–4.47)
LYMPHOCYTES NFR BLD AUTO: 35 % (ref 14–44)
MAGNESIUM SERPL-MCNC: 2 MG/DL (ref 1.9–2.7)
MCH RBC QN AUTO: 32.5 PG (ref 26.8–34.3)
MCHC RBC AUTO-ENTMCNC: 34.1 G/DL (ref 31.4–37.4)
MCV RBC AUTO: 95 FL (ref 82–98)
MICROALBUMIN UR-MCNC: 7.4 MG/L
MICROALBUMIN/CREAT 24H UR: 17 MG/G CREATININE (ref 0–30)
MONOCYTES # BLD AUTO: 0.49 THOUSAND/ÂΜL (ref 0.17–1.22)
MONOCYTES NFR BLD AUTO: 11 % (ref 4–12)
NEUTROPHILS # BLD AUTO: 2.25 THOUSANDS/ÂΜL (ref 1.85–7.62)
NEUTS SEG NFR BLD AUTO: 49 % (ref 43–75)
NITRITE UR QL STRIP: NEGATIVE
NON-SQ EPI CELLS URNS QL MICRO: NORMAL /HPF
NRBC BLD AUTO-RTO: 0 /100 WBCS
PH UR STRIP.AUTO: 6.5 [PH]
PHOSPHATE SERPL-MCNC: 3.5 MG/DL (ref 2.3–4.1)
PLATELET # BLD AUTO: 283 THOUSANDS/UL (ref 149–390)
PMV BLD AUTO: 11.3 FL (ref 8.9–12.7)
POTASSIUM SERPL-SCNC: 4.1 MMOL/L (ref 3.5–5.3)
PROT SERPL-MCNC: 7.6 G/DL (ref 6.4–8.4)
PROT UR STRIP-MCNC: NEGATIVE MG/DL
RBC # BLD AUTO: 4.53 MILLION/UL (ref 3.81–5.12)
RBC #/AREA URNS AUTO: NORMAL /HPF
SODIUM SERPL-SCNC: 140 MMOL/L (ref 135–147)
SP GR UR STRIP.AUTO: 1.01 (ref 1–1.03)
UROBILINOGEN UR STRIP-ACNC: <2 MG/DL
WBC # BLD AUTO: 4.56 THOUSAND/UL (ref 4.31–10.16)
WBC #/AREA URNS AUTO: NORMAL /HPF

## 2025-08-04 PROCEDURE — 84100 ASSAY OF PHOSPHORUS: CPT

## 2025-08-04 PROCEDURE — 83735 ASSAY OF MAGNESIUM: CPT

## 2025-08-04 PROCEDURE — 99214 OFFICE O/P EST MOD 30 MIN: CPT | Performed by: NURSE PRACTITIONER

## 2025-08-04 PROCEDURE — 82043 UR ALBUMIN QUANTITATIVE: CPT

## 2025-08-04 PROCEDURE — 82088 ASSAY OF ALDOSTERONE: CPT

## 2025-08-04 PROCEDURE — 82306 VITAMIN D 25 HYDROXY: CPT

## 2025-08-04 PROCEDURE — 85025 COMPLETE CBC W/AUTO DIFF WBC: CPT

## 2025-08-04 PROCEDURE — 84244 ASSAY OF RENIN: CPT

## 2025-08-04 PROCEDURE — 36415 COLL VENOUS BLD VENIPUNCTURE: CPT

## 2025-08-04 PROCEDURE — 81001 URINALYSIS AUTO W/SCOPE: CPT

## 2025-08-04 PROCEDURE — 82570 ASSAY OF URINE CREATININE: CPT

## 2025-08-04 PROCEDURE — 80053 COMPREHEN METABOLIC PANEL: CPT

## 2025-08-08 LAB
ALDOST SERPL-MCNC: 3.7 NG/DL (ref 0–30)
ALDOST/RENIN PLAS-RTO: 10.6 {RATIO} (ref 0–30)
RENIN PLAS-CCNC: 0.35 NG/ML/HR (ref 0.17–5.38)